# Patient Record
Sex: FEMALE | Race: WHITE | NOT HISPANIC OR LATINO | Employment: UNEMPLOYED | URBAN - METROPOLITAN AREA
[De-identification: names, ages, dates, MRNs, and addresses within clinical notes are randomized per-mention and may not be internally consistent; named-entity substitution may affect disease eponyms.]

---

## 2017-01-30 ENCOUNTER — OFFICE VISIT (OUTPATIENT)
Dept: FAMILY MEDICINE | Facility: CLINIC | Age: 67
End: 2017-01-30
Payer: MEDICARE

## 2017-01-30 ENCOUNTER — HOSPITAL ENCOUNTER (OUTPATIENT)
Dept: RADIOLOGY | Facility: HOSPITAL | Age: 67
Discharge: HOME | End: 2017-01-30
Attending: FAMILY MEDICINE
Payer: MEDICARE

## 2017-01-30 ENCOUNTER — HOSPITAL ENCOUNTER (OUTPATIENT)
Dept: RADIOLOGY | Facility: CLINIC | Age: 67
Discharge: HOME | End: 2017-01-30
Attending: FAMILY MEDICINE
Payer: MEDICARE

## 2017-01-30 VITALS
SYSTOLIC BLOOD PRESSURE: 170 MMHG | TEMPERATURE: 98 F | HEART RATE: 63 BPM | DIASTOLIC BLOOD PRESSURE: 84 MMHG | WEIGHT: 91.94 LBS | HEIGHT: 62 IN | OXYGEN SATURATION: 97 % | BODY MASS INDEX: 16.92 KG/M2

## 2017-01-30 DIAGNOSIS — Z12.11 COLON CANCER SCREENING: ICD-10-CM

## 2017-01-30 DIAGNOSIS — Z12.31 ENCOUNTER FOR SCREENING MAMMOGRAM FOR BREAST CANCER: ICD-10-CM

## 2017-01-30 DIAGNOSIS — Z12.4 CERVICAL CANCER SCREENING: ICD-10-CM

## 2017-01-30 DIAGNOSIS — M94.9 DISORDER OF BONE AND ARTICULAR CARTILAGE: ICD-10-CM

## 2017-01-30 DIAGNOSIS — R53.1 WEAKNESS: ICD-10-CM

## 2017-01-30 DIAGNOSIS — Z00.00 ANNUAL PHYSICAL EXAM: Primary | ICD-10-CM

## 2017-01-30 DIAGNOSIS — Z11.59 NEED FOR HEPATITIS C SCREENING TEST: ICD-10-CM

## 2017-01-30 DIAGNOSIS — M89.9 DISORDER OF BONE AND ARTICULAR CARTILAGE: ICD-10-CM

## 2017-01-30 PROCEDURE — 88175 CYTOPATH C/V AUTO FLUID REDO: CPT

## 2017-01-30 PROCEDURE — 77080 DXA BONE DENSITY AXIAL: CPT | Mod: TC,PO

## 2017-01-30 PROCEDURE — 99999 PR PBB SHADOW E&M-EST. PATIENT-LVL III: CPT | Mod: PBBFAC,,, | Performed by: FAMILY MEDICINE

## 2017-01-30 PROCEDURE — 77067 SCR MAMMO BI INCL CAD: CPT | Mod: TC

## 2017-01-30 PROCEDURE — 77067 SCR MAMMO BI INCL CAD: CPT | Mod: 26,,, | Performed by: RADIOLOGY

## 2017-01-30 PROCEDURE — 99397 PER PM REEVAL EST PAT 65+ YR: CPT | Mod: S$GLB,,, | Performed by: FAMILY MEDICINE

## 2017-01-30 PROCEDURE — 77063 BREAST TOMOSYNTHESIS BI: CPT | Mod: 26,,, | Performed by: RADIOLOGY

## 2017-01-30 PROCEDURE — 77080 DXA BONE DENSITY AXIAL: CPT | Mod: 26,,, | Performed by: INTERNAL MEDICINE

## 2017-01-30 RX ORDER — IBUPROFEN 200 MG
200 TABLET ORAL EVERY 6 HOURS PRN
COMMUNITY
End: 2019-04-18

## 2017-01-30 NOTE — MR AVS SNAPSHOT
[facility]  [address]  [place] [address]  Phone: [phone]  Fax: [phone]                  Mounika Barnett   2017 1:20 PM   Office Visit    Description:  Female : 1950   Provider:  Helga Harmon MD   Department:  [facility]           Reason for Visit     Annual Exam           Diagnoses this Visit        Comments    Annual physical exam    -  Primary     Disorder of bone and articular cartilage         Colon cancer screening         Cervical cancer screening         Encounter for screening mammogram for breast cancer         Need for hepatitis C screening test         Weakness                To Do List           Goals (5 Years of Data)     None      [facility]     [facility] -  Assistance  Registered nurses in the [facility] provide clinical advisement, health education, appointment booking, and other advisory services.  Call for this free service at [phone].             Medications           Message regarding Medications     Verify the changes and/or additions to your medication regime listed below are the same as discussed with your clinician today.  If any of these changes or additions are incorrect, please notify your healthcare provider.        STOP taking these medications     meloxicam (MOBIC) 15 MG tablet Take 1 tablet (15 mg total) by mouth once daily.           Verify that the below list of medications is an accurate representation of the medications you are currently taking.  If none reported, the list may be blank. If incorrect, please contact your healthcare provider. Carry this list with you in case of emergency.           Current Medications     ibuprofen (ADVIL,MOTRIN) 200 MG tablet Take 200 mg by mouth every 6 (six) hours as needed for Pain.           Clinical Reference Information           Vital Signs - Last Recorded  Most recent update: 2017  1:20 PM by Myra  "JULES Nelson    BP Pulse Temp Ht Wt SpO2    (!) 170/84 (BP Location: Left arm, Patient Position: Sitting, BP Method: Manual) 63 98 °F (36.7 °C) (Oral) 5' 2" (1.575 m) 41.7 kg (91 lb 14.9 oz) 97%    BMI                16.81 kg/m2          Blood Pressure          Most Recent Value    BP  (!)  170/84      Allergies as of 1/30/2017     No Known Allergies      Immunizations Administered on Date of Encounter - 1/30/2017     None      Orders Placed During Today's Visit      Normal Orders This Visit    Liquid-based pap smear, screening     Future Labs/Procedures Expected by Expires    DXA Bone Density Spine And Hip  1/30/2017 1/30/2018    Hepatitis C antibody  1/30/2017 3/31/2018    Mammo Digital Screening Bilateral With CAD  1/30/2017 3/30/2018    Occult blood x 1, stool  1/30/2017 1/30/2018    Occult blood x 1, stool  1/30/2017 1/30/2018    Occult blood x 1, stool  1/30/2017 1/30/2018      "

## 2017-02-07 NOTE — PROGRESS NOTES
I recommend increase calcium and vitamin D.  You should take at least 600 mg of calcium daily.  You can take 2000 IU of vitamin D daily.

## 2017-02-10 ENCOUNTER — LAB VISIT (OUTPATIENT)
Dept: LAB | Facility: HOSPITAL | Age: 67
End: 2017-02-10
Attending: FAMILY MEDICINE
Payer: MEDICARE

## 2017-02-10 DIAGNOSIS — R53.1 WEAKNESS: ICD-10-CM

## 2017-02-10 DIAGNOSIS — Z12.11 COLON CANCER SCREENING: ICD-10-CM

## 2017-02-10 PROCEDURE — 82272 OCCULT BLD FECES 1-3 TESTS: CPT | Mod: 91

## 2017-02-11 LAB
OB PNL STL: NEGATIVE

## 2017-02-17 ENCOUNTER — TELEPHONE (OUTPATIENT)
Dept: FAMILY MEDICINE | Facility: CLINIC | Age: 67
End: 2017-02-17

## 2017-02-17 NOTE — TELEPHONE ENCOUNTER
She has very good blood pressures at home.  I really do not see a need to add medication.  Her blood pressure at home is wonderful and likely gets elevated when she is here.

## 2017-02-17 NOTE — TELEPHONE ENCOUNTER
Patient came in for a blood pressure reading and also brought in her machine, I received 170/72 and the machine registered 163/89. Also copied her readings she has been taken at home. This is on your desk.

## 2017-02-20 ENCOUNTER — TELEPHONE (OUTPATIENT)
Dept: FAMILY MEDICINE | Facility: CLINIC | Age: 67
End: 2017-02-20

## 2017-02-20 NOTE — TELEPHONE ENCOUNTER
----- Message from Yaz Nichole sent at 2/17/2017  1:53 PM CST -----  Patient is returning call. please call at [phone] Thank you!

## 2017-02-20 NOTE — TELEPHONE ENCOUNTER
----- Message from Julissa Rodriguez sent at 2/17/2017  4:16 PM CST -----  Contact: Self  Pt states she's returning a call. Pt can be reached @ [phone].

## 2017-04-04 ENCOUNTER — OFFICE VISIT (OUTPATIENT)
Dept: FAMILY MEDICINE | Facility: CLINIC | Age: 67
End: 2017-04-04
Payer: MEDICARE

## 2017-04-04 VITALS
HEIGHT: 62 IN | HEART RATE: 74 BPM | OXYGEN SATURATION: 96 % | BODY MASS INDEX: 16.2 KG/M2 | WEIGHT: 88 LBS | TEMPERATURE: 98 F | SYSTOLIC BLOOD PRESSURE: 130 MMHG | DIASTOLIC BLOOD PRESSURE: 84 MMHG

## 2017-04-04 DIAGNOSIS — H66.004 RECURRENT ACUTE SUPPURATIVE OTITIS MEDIA OF RIGHT EAR WITHOUT SPONTANEOUS RUPTURE OF TYMPANIC MEMBRANE: Primary | ICD-10-CM

## 2017-04-04 PROCEDURE — 1160F RVW MEDS BY RX/DR IN RCRD: CPT | Mod: S$GLB,,, | Performed by: FAMILY MEDICINE

## 2017-04-04 PROCEDURE — 99999 PR PBB SHADOW E&M-EST. PATIENT-LVL III: CPT | Mod: PBBFAC,,, | Performed by: FAMILY MEDICINE

## 2017-04-04 PROCEDURE — 99213 OFFICE O/P EST LOW 20 MIN: CPT | Mod: S$GLB,,, | Performed by: FAMILY MEDICINE

## 2017-04-04 RX ORDER — AMOXICILLIN 500 MG/1
500 TABLET, FILM COATED ORAL EVERY 12 HOURS
Qty: 20 TABLET | Refills: 0 | Status: SHIPPED | OUTPATIENT
Start: 2017-04-04 | End: 2017-04-14

## 2017-04-04 RX ORDER — NEOMYCIN SULFATE, POLYMYXIN B SULFATE, HYDROCORTISONE 3.5; 10000; 1 MG/ML; [USP'U]/ML; MG/ML
3 SOLUTION/ DROPS AURICULAR (OTIC) EVERY 8 HOURS
Qty: 10 ML | Refills: 0 | Status: SHIPPED | OUTPATIENT
Start: 2017-04-04 | End: 2017-04-07

## 2017-04-04 NOTE — MR AVS SNAPSHOT
[facility]  [address]  [place] [address]  Phone: [phone]  Fax: [phone]                  Mounika Barnett   2017 11:20 AM   Office Visit    Description:  Female : 1950   Provider:  Helga Harmon MD   Department:  [facility]           Reason for Visit     possible infected ear     fluid in ear           Diagnoses this Visit        Comments    Recurrent acute suppurative otitis media of right ear without spontaneous rupture of tympanic membrane    -  Primary            To Do List           Goals (5 Years of Data)     None       These Medications        Disp Refills Start End    amoxicillin (AMOXIL) 500 MG Tab 20 tablet 0 2017    Take 1 tablet (500 mg total) by mouth every 12 (twelve) hours. - Oral    Pharmacy: [facility] [address] [place] [address] [place]  [address] [facility] Ph #: [phone]       neomycin-polymyxin-hydrocortisone (CORTISPORIN) otic solution 10 mL 0 2017    Place 3 drops into the right ear every 8 (eight) hours. - Right Ear    Pharmacy: [facility] [address] [place], [place] [address] - [address] [facility] Ph #: [phone]         [facility]     [facility] Care Line - 24/7 Assistance  Unless otherwise directed by your provider, please contact [facility], our nurse care line that is available for 24/7 assistance.     Registered nurses in the [facility] provide: appointment scheduling, clinical advisement, health education, and other advisory services.  Call: [phone] (toll free)               Medications           Message regarding Medications     Verify the changes and/or additions to your medication regime listed below are the same as discussed with your clinician today.  If any of these changes or additions are incorrect, please notify your healthcare provider.        START taking  "these NEW medications        Refills    amoxicillin (AMOXIL) 500 MG Tab 0    Sig: Take 1 tablet (500 mg total) by mouth every 12 (twelve) hours.    Class: Normal    Route: Oral    neomycin-polymyxin-hydrocortisone (CORTISPORIN) otic solution 0    Sig: Place 3 drops into the right ear every 8 (eight) hours.    Class: Normal    Route: Right Ear           Verify that the below list of medications is an accurate representation of the medications you are currently taking.  If none reported, the list may be blank. If incorrect, please contact your healthcare provider. Carry this list with you in case of emergency.           Current Medications     amoxicillin (AMOXIL) 500 MG Tab Take 1 tablet (500 mg total) by mouth every 12 (twelve) hours.    ibuprofen (ADVIL,MOTRIN) 200 MG tablet Take 200 mg by mouth every 6 (six) hours as needed for Pain.    neomycin-polymyxin-hydrocortisone (CORTISPORIN) otic solution Place 3 drops into the right ear every 8 (eight) hours.           Clinical Reference Information           Your Vitals Were     BP Pulse Temp Height Weight SpO2    130/84 (BP Location: Left arm, Patient Position: Sitting, BP Method: Manual) 74 98.4 °F (36.9 °C) (Oral) 5' 2" (1.575 m) 39.9 kg (88 lb) 96%    BMI                16.1 kg/m2          Blood Pressure          Most Recent Value    BP  130/84      Allergies as of 4/4/2017     No Known Allergies      Immunizations Administered on Date of Encounter - 4/4/2017     None      Language Assistance Services     ATTENTION: Language assistance services are available, free of charge. Please call [phone].      ATENCIÓN: Si habla mackenzie, tiene a chapa disposición servicios gratuitos de asistencia lingüística. Llame al [phone].     CHÚ Ý: N?u b?n nói Ti?ng Vi?t, có các d?ch v? h? tr? ngôn ng? mi?n phí dành cho b?n. G?i s? [phone].         [facility] complies with applicable Federal civil rights laws and does not discriminate on the basis of " race, color, national origin, age, disability, or sex.

## 2017-04-10 NOTE — PROGRESS NOTES
Office Visit    Patient Name: Mounika Barnett    : 1950  MRN: [id number]    Subjective:  Mounika Howell is a 66 y.o. female who presents today for:    possible infected ear (right) and fluid in ear      This patient has multiple medical diagnoses as noted below.  This patient is known to me and to this clinic.   She reports increased ear pain and fluid in her ear.  No fever.  No N/V.      Active Problem List  There is no problem list on file for this patient.      Past Surgical History  Past Surgical History:   Procedure Laterality Date     SECTION      DILATION AND CURETTAGE OF UTERUS      TONSILLECTOMY         Family History  Family History   Problem Relation Age of Onset    Hypertension Mother     Heart disease Mother     Cancer Father        Social History  Social History     Social History    Marital status:      Spouse name: N/A    Number of children: N/A    Years of education: N/A     Occupational History    Not on file.     Social History Main Topics    Smoking status: Never Smoker    Smokeless tobacco: Not on file    Alcohol use No    Drug use: No    Sexual activity: No     Other Topics Concern    Not on file     Social History Narrative       Current Medications  Medications reviewed and updated.     Allergies   Review of patient's allergies indicates:  No Known Allergies    Review of Systems (Pertinent positives)  Review of Systems   Constitutional: Negative for activity change, appetite change, fatigue, fever and unexpected weight change.   HENT: Positive for ear discharge and ear pain. Negative for rhinorrhea and sore throat.    Eyes: Negative.    Respiratory: Negative for apnea, cough, chest tightness, shortness of breath and wheezing.    Cardiovascular: Negative for chest pain, palpitations and leg swelling.   Gastrointestinal: Negative for abdominal distention, abdominal pain, constipation, diarrhea and vomiting.   Endocrine: Negative for cold intolerance,  "heat intolerance, polydipsia and polyuria.   Genitourinary: Negative for decreased urine volume, menstrual problem, urgency, vaginal bleeding, vaginal discharge and vaginal pain.   Musculoskeletal: Negative.    Skin: Negative for rash.   Neurological: Negative for dizziness and headaches.   Hematological: Does not bruise/bleed easily.   Psychiatric/Behavioral: Negative for agitation, sleep disturbance and suicidal ideas.       /84 (BP Location: Left arm, Patient Position: Sitting, BP Method: Manual)  Pulse 74  Temp 98.4 °F (36.9 °C) (Oral)   Ht 5' 2" (1.575 m)  Wt 39.9 kg (88 lb)  SpO2 96%  BMI 16.1 kg/m2    Physical Exam   Constitutional: She is oriented to person, place, and time. She appears well-developed and well-nourished.   HENT:   Head: Normocephalic and atraumatic.   Right Ear: External ear normal. There is swelling and tenderness. Tympanic membrane is injected and erythematous.   Left Ear: External ear normal.   Nose: Nose normal.   Mouth/Throat: Oropharynx is clear and moist.   Eyes: Conjunctivae and EOM are normal. Pupils are equal, round, and reactive to light.   Neck: Normal range of motion. No JVD present. No thyromegaly present.   Cardiovascular: Normal rate, regular rhythm and normal heart sounds.    Pulmonary/Chest: Effort normal and breath sounds normal. She has no wheezes.   Musculoskeletal: Normal range of motion.   Lymphadenopathy:     She has no cervical adenopathy.   Neurological: She is alert and oriented to person, place, and time.   Skin: Skin is warm and dry.   Psychiatric: She has a normal mood and affect. Her behavior is normal.   Vitals reviewed.      Health Maintenance  Health Maintenance Topics with due status: Not Due       Topic Last Completion Date    Lipid Panel 01/26/2016    Mammogram 01/30/2017    DEXA SCAN 01/30/2017       Assessment/Plan:  Mounika Barnett is a 66 y.o. female who presents today for :    Mounika Howell was seen today for possible infected ear " and fluid in ear.    Diagnoses and all orders for this visit:    Recurrent acute suppurative otitis media of right ear without spontaneous rupture of tympanic membrane  -     amoxicillin (AMOXIL) 500 MG Tab; Take 1 tablet (500 mg total) by mouth every 12 (twelve) hours.  -     neomycin-polymyxin-hydrocortisone (CORTISPORIN) otic solution; Place 3 drops into the right ear every 8 (eight) hours.  -    I have discussed the common side effects of this medication with the patient and answered all of the questions they had at the time of this visit regarding this medication.  -  RTC if symptoms worsen or persist.      No Follow-up on file.

## 2017-08-25 DIAGNOSIS — Z12.11 COLON CANCER SCREENING: ICD-10-CM

## 2017-10-10 ENCOUNTER — CLINICAL SUPPORT (OUTPATIENT)
Dept: FAMILY MEDICINE | Facility: CLINIC | Age: 67
End: 2017-10-10
Payer: MEDICARE

## 2017-10-10 PROCEDURE — 99499 UNLISTED E&M SERVICE: CPT | Mod: S$GLB,,, | Performed by: FAMILY MEDICINE

## 2017-10-10 PROCEDURE — 90662 IIV NO PRSV INCREASED AG IM: CPT | Mod: S$GLB,,, | Performed by: FAMILY MEDICINE

## 2017-10-10 PROCEDURE — G0008 ADMIN INFLUENZA VIRUS VAC: HCPCS | Mod: S$GLB,,, | Performed by: FAMILY MEDICINE

## 2018-03-20 ENCOUNTER — PES CALL (OUTPATIENT)
Dept: ADMINISTRATIVE | Facility: CLINIC | Age: 68
End: 2018-03-20

## 2018-03-21 ENCOUNTER — LAB VISIT (OUTPATIENT)
Dept: LAB | Facility: HOSPITAL | Age: 68
End: 2018-03-21
Attending: FAMILY MEDICINE
Payer: MEDICARE

## 2018-03-21 ENCOUNTER — OFFICE VISIT (OUTPATIENT)
Dept: FAMILY MEDICINE | Facility: CLINIC | Age: 68
End: 2018-03-21
Payer: MEDICARE

## 2018-03-21 VITALS
BODY MASS INDEX: 16.02 KG/M2 | TEMPERATURE: 98 F | WEIGHT: 87.06 LBS | SYSTOLIC BLOOD PRESSURE: 150 MMHG | HEART RATE: 64 BPM | OXYGEN SATURATION: 97 % | DIASTOLIC BLOOD PRESSURE: 80 MMHG | HEIGHT: 62 IN

## 2018-03-21 DIAGNOSIS — Z00.00 ANNUAL PHYSICAL EXAM: Primary | ICD-10-CM

## 2018-03-21 DIAGNOSIS — E44.1 MILD PROTEIN MALNUTRITION: ICD-10-CM

## 2018-03-21 DIAGNOSIS — Z23 NEED FOR VACCINATION WITH 13-POLYVALENT PNEUMOCOCCAL CONJUGATE VACCINE: ICD-10-CM

## 2018-03-21 DIAGNOSIS — Z00.00 ANNUAL PHYSICAL EXAM: ICD-10-CM

## 2018-03-21 LAB
ALBUMIN SERPL BCP-MCNC: 4.1 G/DL
ALP SERPL-CCNC: 91 U/L
ALT SERPL W/O P-5'-P-CCNC: 22 U/L
ANION GAP SERPL CALC-SCNC: 9 MMOL/L
AST SERPL-CCNC: 26 U/L
BASOPHILS # BLD AUTO: 0.04 K/UL
BASOPHILS NFR BLD: 0.8 %
BILIRUB SERPL-MCNC: 0.7 MG/DL
BUN SERPL-MCNC: 12 MG/DL
CALCIUM SERPL-MCNC: 10.2 MG/DL
CHLORIDE SERPL-SCNC: 104 MMOL/L
CHOLEST SERPL-MCNC: 186 MG/DL
CHOLEST/HDLC SERPL: 2.5 {RATIO}
CO2 SERPL-SCNC: 28 MMOL/L
CREAT SERPL-MCNC: 0.7 MG/DL
DIFFERENTIAL METHOD: ABNORMAL
EOSINOPHIL # BLD AUTO: 0.1 K/UL
EOSINOPHIL NFR BLD: 1 %
ERYTHROCYTE [DISTWIDTH] IN BLOOD BY AUTOMATED COUNT: 12.3 %
EST. GFR  (AFRICAN AMERICAN): >60 ML/MIN/1.73 M^2
EST. GFR  (NON AFRICAN AMERICAN): >60 ML/MIN/1.73 M^2
GLUCOSE SERPL-MCNC: 87 MG/DL
HCT VFR BLD AUTO: 47.2 %
HDLC SERPL-MCNC: 75 MG/DL
HDLC SERPL: 40.3 %
HGB BLD-MCNC: 16 G/DL
IMM GRANULOCYTES # BLD AUTO: 0.01 K/UL
IMM GRANULOCYTES NFR BLD AUTO: 0.2 %
LDLC SERPL CALC-MCNC: 98.2 MG/DL
LYMPHOCYTES # BLD AUTO: 1.2 K/UL
LYMPHOCYTES NFR BLD: 23.7 %
MCH RBC QN AUTO: 32.6 PG
MCHC RBC AUTO-ENTMCNC: 33.9 G/DL
MCV RBC AUTO: 96 FL
MONOCYTES # BLD AUTO: 0.6 K/UL
MONOCYTES NFR BLD: 11.7 %
NEUTROPHILS # BLD AUTO: 3.1 K/UL
NEUTROPHILS NFR BLD: 62.6 %
NONHDLC SERPL-MCNC: 111 MG/DL
NRBC BLD-RTO: 0 /100 WBC
PLATELET # BLD AUTO: 280 K/UL
PMV BLD AUTO: 11 FL
POTASSIUM SERPL-SCNC: 4.8 MMOL/L
PROT SERPL-MCNC: 7.3 G/DL
RBC # BLD AUTO: 4.91 M/UL
SODIUM SERPL-SCNC: 141 MMOL/L
TRIGL SERPL-MCNC: 64 MG/DL
TSH SERPL DL<=0.005 MIU/L-ACNC: 1.32 UIU/ML
WBC # BLD AUTO: 4.94 K/UL

## 2018-03-21 PROCEDURE — 85025 COMPLETE CBC W/AUTO DIFF WBC: CPT

## 2018-03-21 PROCEDURE — 80061 LIPID PANEL: CPT

## 2018-03-21 PROCEDURE — 90670 PCV13 VACCINE IM: CPT | Mod: S$GLB,,, | Performed by: FAMILY MEDICINE

## 2018-03-21 PROCEDURE — G0009 ADMIN PNEUMOCOCCAL VACCINE: HCPCS | Mod: S$GLB,,, | Performed by: FAMILY MEDICINE

## 2018-03-21 PROCEDURE — 84443 ASSAY THYROID STIM HORMONE: CPT

## 2018-03-21 PROCEDURE — 99499 UNLISTED E&M SERVICE: CPT | Mod: S$GLB,,, | Performed by: FAMILY MEDICINE

## 2018-03-21 PROCEDURE — 99397 PER PM REEVAL EST PAT 65+ YR: CPT | Mod: 25,S$GLB,, | Performed by: FAMILY MEDICINE

## 2018-03-21 PROCEDURE — 99999 PR PBB SHADOW E&M-EST. PATIENT-LVL III: CPT | Mod: PBBFAC,,, | Performed by: FAMILY MEDICINE

## 2018-03-21 PROCEDURE — 36415 COLL VENOUS BLD VENIPUNCTURE: CPT | Mod: PO

## 2018-03-21 PROCEDURE — 80053 COMPREHEN METABOLIC PANEL: CPT

## 2018-03-21 NOTE — PROGRESS NOTES
Office Visit    Patient Name: Mounika Barnett    : 1950  MRN: [id number]    Subjective:  Mounika Howell is a 67 y.o. female who presents today for:    Annual Exam      This patient has multiple medical diagnoses as noted below.  This patient is known to me and to this clinic. Patient denies any new symptoms including chest pain, SOB, blurry vision, N/V, diarrhea.        There is no problem list on file for this patient.      Past Surgical History:   Procedure Laterality Date     SECTION      DILATION AND CURETTAGE OF UTERUS      TONSILLECTOMY         Family History   Problem Relation Age of Onset    Hypertension Mother     Heart disease Mother     Cancer Father        Social History     Social History    Marital status:      Spouse name: N/A    Number of children: N/A    Years of education: N/A     Occupational History    Not on file.     Social History Main Topics    Smoking status: Never Smoker    Smokeless tobacco: Not on file    Alcohol use No    Drug use: No    Sexual activity: No     Other Topics Concern    Not on file     Social History Narrative    No narrative on file       Current Medications  Medications reviewed and updated.     Allergies   Review of patient's allergies indicates:  No Known Allergies      Labs  Lab Results   Component Value Date    HGBA1C 5.2 2016     Lab Results   Component Value Date     2016    K 4.7 2016     2016    CO2 29 2016    BUN 18 2016    CREATININE 0.7 2016    CALCIUM 9.6 2016    ANIONGAP 7 (L) 2016    ESTGFRAFRICA >60.0 2016    EGFRNONAA >60.0 2016     Lab Results   Component Value Date    CHOL 196 2016    CHOL 181 10/14/2011     Lab Results   Component Value Date    HDL 80 (H) 2016    HDL 71 10/14/2011     Lab Results   Component Value Date    LDLCALC 102.8 2016    LDLCALC 98.6 10/14/2011     Lab Results   Component Value Date    TRIG  "66 01/26/2016    TRIG 57 10/14/2011     Lab Results   Component Value Date    CHOLHDL 40.8 01/26/2016    CHOLHDL 39.2 10/14/2011     Last set of blood work has been reviewed as noted above.    Review of Systems   Constitutional: Negative for activity change, appetite change, fatigue, fever and unexpected weight change.   HENT: Negative.  Negative for ear discharge, ear pain, rhinorrhea and sore throat.    Eyes: Negative.    Respiratory: Negative for apnea, cough, chest tightness, shortness of breath and wheezing.    Cardiovascular: Negative for chest pain, palpitations and leg swelling.   Gastrointestinal: Negative for abdominal distention, abdominal pain, constipation, diarrhea and vomiting.   Endocrine: Negative for cold intolerance, heat intolerance, polydipsia and polyuria.   Genitourinary: Negative for decreased urine volume, menstrual problem, urgency, vaginal bleeding, vaginal discharge and vaginal pain.   Musculoskeletal: Negative.    Skin: Negative for rash.   Neurological: Negative for dizziness and headaches.   Hematological: Does not bruise/bleed easily.   Psychiatric/Behavioral: Negative for agitation, sleep disturbance and suicidal ideas.       BP (!) 150/80 (BP Location: Right arm, Patient Position: Sitting, BP Method: Medium (Manual))   Pulse 64   Temp 97.6 °F (36.4 °C) (Oral)   Ht 5' 2" (1.575 m)   Wt 39.5 kg (87 lb 1.3 oz)   SpO2 97%   BMI 15.93 kg/m²      Physical Exam   Constitutional: She is oriented to person, place, and time. She appears well-developed and well-nourished.   HENT:   Head: Normocephalic and atraumatic.   Right Ear: External ear normal.   Left Ear: External ear normal.   Nose: Nose normal.   Mouth/Throat: Oropharynx is clear and moist.   Eyes: Conjunctivae and EOM are normal. Pupils are equal, round, and reactive to light.   Neck: Normal range of motion. No JVD present. No thyromegaly present.   Cardiovascular: Normal rate, regular rhythm and normal heart sounds.  "   Pulmonary/Chest: Effort normal and breath sounds normal. She has no wheezes.   Abdominal: Soft. Bowel sounds are normal. She exhibits no distension. There is no tenderness.   Musculoskeletal: Normal range of motion.   Lymphadenopathy:     She has no cervical adenopathy.   Neurological: She is alert and oriented to person, place, and time. She has normal reflexes.   Skin: Skin is warm and dry.   Psychiatric: She has a normal mood and affect. Her behavior is normal. Judgment and thought content normal.   Vitals reviewed.      Health Maintenance  Health Maintenance       Date Due Completion Date    TETANUS VACCINE 10/04/1969 ---    Colonoscopy 10/04/2001 ---    Zoster Vaccine 10/04/2011 ---    Pneumococcal (65+) (1 of 2 - PCV13) 10/04/2016 ---    Mammogram 01/30/2019 1/30/2017    DEXA SCAN 01/30/2020 1/30/2017    Lipid Panel 01/26/2021 1/26/2016          Assessment/Plan:  Mounika Barnett is a 67 y.o. female who presents today for :    1. Annual physical exam    2. Mild protein malnutrition    3. Need for vaccination with 13-polyvalent pneumococcal conjugate vaccine        Problem List Items Addressed This Visit     None      Visit Diagnoses     Annual physical exam    -  Primary    Relevant Orders    TSH    Lipid panel    Comprehensive metabolic panel    CBC auto differential    Mild protein malnutrition        Relevant Orders    TSH    Lipid panel    Comprehensive metabolic panel    CBC auto differential    Need for vaccination with 13-polyvalent pneumococcal conjugate vaccine        Relevant Orders    Pneumococcal Conjugate Vaccine (13 Valent) (IM) (Completed)          Follow-up in about 1 year (around 3/21/2019).     This note was created by combination of typed  and Dragon dictation.  Transcription errors may be present.  If there are any questions, please contact me.

## 2018-04-10 ENCOUNTER — PES CALL (OUTPATIENT)
Dept: ADMINISTRATIVE | Facility: CLINIC | Age: 68
End: 2018-04-10

## 2018-05-03 ENCOUNTER — OFFICE VISIT (OUTPATIENT)
Dept: FAMILY MEDICINE | Facility: CLINIC | Age: 68
End: 2018-05-03
Payer: MEDICARE

## 2018-05-03 VITALS
BODY MASS INDEX: 16.43 KG/M2 | HEART RATE: 59 BPM | WEIGHT: 89.31 LBS | HEIGHT: 62 IN | DIASTOLIC BLOOD PRESSURE: 78 MMHG | SYSTOLIC BLOOD PRESSURE: 110 MMHG | OXYGEN SATURATION: 97 %

## 2018-05-03 DIAGNOSIS — M19.042 OSTEOARTHRITIS OF BOTH HANDS, UNSPECIFIED OSTEOARTHRITIS TYPE: ICD-10-CM

## 2018-05-03 DIAGNOSIS — M19.041 OSTEOARTHRITIS OF BOTH HANDS, UNSPECIFIED OSTEOARTHRITIS TYPE: ICD-10-CM

## 2018-05-03 DIAGNOSIS — Z00.00 ENCOUNTER FOR PREVENTIVE HEALTH EXAMINATION: Primary | ICD-10-CM

## 2018-05-03 PROCEDURE — 99999 PR PBB SHADOW E&M-EST. PATIENT-LVL III: CPT | Mod: PBBFAC,,, | Performed by: NURSE PRACTITIONER

## 2018-05-03 PROCEDURE — G0439 PPPS, SUBSEQ VISIT: HCPCS | Mod: S$GLB,,, | Performed by: NURSE PRACTITIONER

## 2018-05-03 NOTE — PROGRESS NOTES
"Mounika Barnett presented for a  Medicare AWV and comprehensive Health Risk Assessment today. The following components were reviewed and updated:    · Medical history  · Family History  · Social history  · Allergies and Current Medications  · Health Risk Assessment  · Health Maintenance  · Care Team     ** See Completed Assessments for Annual Wellness Visit within the encounter summary.**       The following assessments were completed:  · Living Situation  · CAGE  · Depression Screening  · Timed Get Up and Go  · Whisper Test  · Cognitive Function Screening  · Nutrition Screening  · ADL Screening  · PAQ Screening    Vitals:    05/03/18 0932   BP: 110/78   BP Location: Left arm   Patient Position: Sitting   BP Method: Large (Manual)   Pulse: (!) 59   SpO2: 97%   Weight: 40.5 kg (89 lb 4.6 oz)   Height: 5' 2" (1.575 m)     Body mass index is 16.33 kg/m².  Physical Exam   Constitutional: She is oriented to person, place, and time.   Cardiovascular: Normal rate.    Pulmonary/Chest: Effort normal.   Neurological: She is alert and oriented to person, place, and time.   Vitals reviewed.        Diagnoses and health risks identified today and associated recommendations/orders:    1. Encounter for preventive health examination  Education provided about preventive health examinations and procedures; addressed and discussed patient's health concerns. Additionally, reviewed medical record for risk factors and documented the results during this encounter.    2. Body mass index (BMI) less than 16.5  Patient prepare family meals based upon a diabetic diet.     3. Osteoarthritis of both hands, unspecified osteoarthritis type  Discussed OTC supplements and hand exercises she can perform when having arthritic type pain.  Advised about NSAIDS     Reviewed health maintenance with patient, educated about recommended examinations, procedures (labs & images), and immunizations. Declined tetanus and zoster vaccines at today's visit. " Encouraged to obtain at local pharmacy if she desires the vaccinations.     Provided Mounika Howell with a 5-10 year written screening schedule and personal prevention plan. Recommendations were developed using the USPSTF age appropriate recommendations. Education, counseling, and referrals were provided as needed. After Visit Summary printed and given to patient which includes a list of additional screenings\tests needed.    Follow-up in about 1 year (around 5/3/2019) for assessment .    Stanford Craig Jr, NP

## 2018-05-03 NOTE — PATIENT INSTRUCTIONS
Counseling and Referral of Other Preventative  (Italic type indicates deductible and co-insurance are waived)    Patient Name: Mounika Barnett  Today's Date: 5/3/2018    Health Maintenance       Date Due Completion Date    TETANUS VACCINE 10/04/1969 ---    Colonoscopy 10/04/2001 ---    Zoster Vaccine 10/04/2011 ---    Influenza Vaccine 08/01/2018 10/10/2017    Mammogram 01/30/2019 1/30/2017    Pneumococcal (65+) (2 of 2 - PPSV23) 03/21/2019 3/21/2018    DEXA SCAN 01/30/2020 1/30/2017    Lipid Panel 03/21/2023 3/21/2018        No orders of the defined types were placed in this encounter.    The following information is provided to all patients.  This information is to help you find resources for any of the problems found today that may be affecting your health:                Living healthy guide: www.Pending [facility] to [facility].[place].[facility]      Understanding Diabetes: www.diabetes.org      Eating healthy: www.cdc.gov/healthyweight      [facility] home safety checklist: www.cdc.gov/steadi/patient.html      Agency on Aging: www.goea.[place].[facility]      Alcoholics anonymous (AA): www.aa.org      Physical Activity: www.flakito.nih.gov/ri5hews      Tobacco use: www.quitwithusla.org     Chondroitin; Glucosamine tablets or capsules  What is this medicine?  CHONDROITIN; GLUCOSAMINE (katelin DROI tin; gloo KOH nereida meen) is a dietary supplement. It is promoted for its ability to reduce the symptoms of osteoarthritis by maintaining healthy joint cartilage. The FDA has not approved this supplement for any medical use.  How should I use this medicine?  Take this supplement by mouth with a glass of water. Follow the directions on the package labeling, or take as directed by your health care professional. Take your medicine at regular intervals. Do not take this supplement more often than directed.  Talk to your pediatrician regarding the use of this medicine in children. Special care may be needed.  What side effects may I notice from receiving this  medicine?  Side effects that you should report to your doctor or health care professional as soon as possible:  · allergic reactions like skin rash, itching or hives, swelling of the face, lips, or tongue  · breathing problems  · constipation  · diarrhea  · difficulty sleeping  · drowsiness  · hair loss  · headache  · loss of appetite  · stomach pain  · swelling of the ankles or feet  Side effects that usually do not require medical attention (report to your doctor or health care professional if they continue or are bothersome):  · gas  · nausea  · upset stomach  What may interact with this medicine?  Check with your doctor or healthcare professional if you are taking any of the following medications:  · warfarin  What if I miss a dose?  If you miss a dose, take it as soon as you can. If it is almost time for your next dose, take only that dose. Do not take double or extra doses.  Where should I keep my medicine?  Keep out of the reach of children.  Store at room temperature between 15 and 30 degrees C (59 and 86 degrees F) or as directed on the package label. Protect from moisture. Throw away any unused supplement after the expiration date.  What should I tell my health care provider before I take this medicine?  They need to know if you have any of these conditions:  · diabetes  · heart disease  · kidney disease  · liver disease  · stomach or intestinal problems  · an unusual or allergic reaction to chondroitin, glucosamine, sulfonamides, other medicines, foods, dyes, or preservatives  · pregnant or trying to get pregnant  · breast-feeding  What should I watch for while using this medicine?  Tell your doctor or healthcare professional if your symptoms do not start to get better or if they get worse. This supplement may take several weeks to work for you.  If you are scheduled for any medical or dental procedure, tell your healthcare provider that you are taking this supplement. You may need to stop taking this  supplement before the procedure.  This supplement may affect blood sugar levels. If you have diabetes, check with your doctor or health care professional before you change your diet or the dose of your diabetic medicine.  Herbal or dietary supplements are not regulated like medicines. Rigid  standards are not required for dietary supplements. The purity and strength of these products can vary. The safety and effect of this dietary supplement for a certain disease or illness is not well known. This product is not intended to diagnose, treat, cure or prevent any disease.  The Food and Drug Administration suggests the following to help consumers protect themselves:  · Always read product labels and follow directions.  · Natural does not mean a product is safe for humans to take.  · Look for products that include USP after the ingredient name. This means that the  followed the standards of the [place] Pharmacopoeia.  · Supplements made or sold by a nationally known food or drug company are more likely to be made under tight controls. You can write to the company for more information about how the product was made.  NOTE:This sheet is a summary. It may not cover all possible information. If you have questions about this medicine, talk to your doctor, pharmacist, or health care provider. Copyright© 2017 [employer] Standard

## 2018-10-01 ENCOUNTER — CLINICAL SUPPORT (OUTPATIENT)
Dept: FAMILY MEDICINE | Facility: CLINIC | Age: 68
End: 2018-10-01
Payer: MEDICARE

## 2018-10-01 DIAGNOSIS — Z23 NEED FOR PROPHYLACTIC VACCINATION AND INOCULATION AGAINST INFLUENZA: Primary | ICD-10-CM

## 2018-10-01 PROCEDURE — 90662 IIV NO PRSV INCREASED AG IM: CPT | Mod: PBBFAC,PO | Performed by: FAMILY MEDICINE

## 2018-10-01 PROCEDURE — 99499 UNLISTED E&M SERVICE: CPT | Mod: S$PBB,,, | Performed by: FAMILY MEDICINE

## 2019-01-25 ENCOUNTER — PES CALL (OUTPATIENT)
Dept: ADMINISTRATIVE | Facility: CLINIC | Age: 69
End: 2019-01-25

## 2019-02-08 DIAGNOSIS — Z12.39 BREAST CANCER SCREENING: ICD-10-CM

## 2019-02-13 ENCOUNTER — HOSPITAL ENCOUNTER (OUTPATIENT)
Dept: RADIOLOGY | Facility: HOSPITAL | Age: 69
Discharge: HOME | End: 2019-02-13
Attending: FAMILY MEDICINE
Payer: MEDICARE

## 2019-02-13 DIAGNOSIS — Z12.39 BREAST CANCER SCREENING: ICD-10-CM

## 2019-02-13 PROCEDURE — 77063 MAMMO DIGITAL SCREENING BILAT WITH TOMOSYNTHESIS_CAD: ICD-10-PCS | Mod: 26,HCNC,, | Performed by: RADIOLOGY

## 2019-02-13 PROCEDURE — 77067 SCR MAMMO BI INCL CAD: CPT | Mod: TC,HCNC,PO

## 2019-02-13 PROCEDURE — 77063 BREAST TOMOSYNTHESIS BI: CPT | Mod: 26,HCNC,, | Performed by: RADIOLOGY

## 2019-02-13 PROCEDURE — 77067 SCR MAMMO BI INCL CAD: CPT | Mod: 26,HCNC,, | Performed by: RADIOLOGY

## 2019-02-13 PROCEDURE — 77067 MAMMO DIGITAL SCREENING BILAT WITH TOMOSYNTHESIS_CAD: ICD-10-PCS | Mod: 26,HCNC,, | Performed by: RADIOLOGY

## 2019-03-07 ENCOUNTER — PATIENT OUTREACH (OUTPATIENT)
Dept: ADMINISTRATIVE | Facility: HOSPITAL | Age: 69
End: 2019-03-07

## 2019-03-07 DIAGNOSIS — Z12.11 SPECIAL SCREENING FOR MALIGNANT NEOPLASM OF COLON: Primary | ICD-10-CM

## 2019-03-21 ENCOUNTER — OFFICE VISIT (OUTPATIENT)
Dept: FAMILY MEDICINE | Facility: CLINIC | Age: 69
End: 2019-03-21
Payer: MEDICARE

## 2019-03-21 ENCOUNTER — LAB VISIT (OUTPATIENT)
Dept: LAB | Facility: HOSPITAL | Age: 69
End: 2019-03-21
Attending: FAMILY MEDICINE
Payer: MEDICARE

## 2019-03-21 VITALS
DIASTOLIC BLOOD PRESSURE: 78 MMHG | HEART RATE: 82 BPM | WEIGHT: 85.56 LBS | BODY MASS INDEX: 15.75 KG/M2 | TEMPERATURE: 98 F | SYSTOLIC BLOOD PRESSURE: 130 MMHG | HEIGHT: 62 IN | OXYGEN SATURATION: 99 %

## 2019-03-21 DIAGNOSIS — E44.1 MILD PROTEIN-CALORIE MALNUTRITION: ICD-10-CM

## 2019-03-21 DIAGNOSIS — Z00.00 ANNUAL PHYSICAL EXAM: Primary | ICD-10-CM

## 2019-03-21 DIAGNOSIS — Z00.00 ANNUAL PHYSICAL EXAM: ICD-10-CM

## 2019-03-21 DIAGNOSIS — Z23 NEED FOR 23-POLYVALENT PNEUMOCOCCAL POLYSACCHARIDE VACCINE: ICD-10-CM

## 2019-03-21 LAB
ALBUMIN SERPL BCP-MCNC: 4.4 G/DL
ALP SERPL-CCNC: 99 U/L
ALT SERPL W/O P-5'-P-CCNC: 22 U/L
ANION GAP SERPL CALC-SCNC: 8 MMOL/L
AST SERPL-CCNC: 23 U/L
BASOPHILS # BLD AUTO: 0.04 K/UL
BASOPHILS NFR BLD: 0.8 %
BILIRUB SERPL-MCNC: 0.8 MG/DL
BUN SERPL-MCNC: 16 MG/DL
CALCIUM SERPL-MCNC: 10.5 MG/DL
CHLORIDE SERPL-SCNC: 103 MMOL/L
CHOLEST SERPL-MCNC: 196 MG/DL
CHOLEST/HDLC SERPL: 2.3 {RATIO}
CO2 SERPL-SCNC: 28 MMOL/L
CREAT SERPL-MCNC: 0.7 MG/DL
DIFFERENTIAL METHOD: ABNORMAL
EOSINOPHIL # BLD AUTO: 0 K/UL
EOSINOPHIL NFR BLD: 0.6 %
ERYTHROCYTE [DISTWIDTH] IN BLOOD BY AUTOMATED COUNT: 12.2 %
EST. GFR  (AFRICAN AMERICAN): >60 ML/MIN/1.73 M^2
EST. GFR  (NON AFRICAN AMERICAN): >60 ML/MIN/1.73 M^2
GLUCOSE SERPL-MCNC: 94 MG/DL
HCT VFR BLD AUTO: 48.3 %
HDLC SERPL-MCNC: 87 MG/DL
HDLC SERPL: 44.4 %
HGB BLD-MCNC: 16.1 G/DL
IMM GRANULOCYTES # BLD AUTO: 0.01 K/UL
IMM GRANULOCYTES NFR BLD AUTO: 0.2 %
LDLC SERPL CALC-MCNC: 96.6 MG/DL
LYMPHOCYTES # BLD AUTO: 1.1 K/UL
LYMPHOCYTES NFR BLD: 21.2 %
MCH RBC QN AUTO: 32.2 PG
MCHC RBC AUTO-ENTMCNC: 33.3 G/DL
MCV RBC AUTO: 97 FL
MONOCYTES # BLD AUTO: 0.6 K/UL
MONOCYTES NFR BLD: 11.2 %
NEUTROPHILS # BLD AUTO: 3.4 K/UL
NEUTROPHILS NFR BLD: 66 %
NONHDLC SERPL-MCNC: 109 MG/DL
NRBC BLD-RTO: 0 /100 WBC
PLATELET # BLD AUTO: 291 K/UL
PMV BLD AUTO: 11 FL
POTASSIUM SERPL-SCNC: 4.4 MMOL/L
PROT SERPL-MCNC: 7.9 G/DL
RBC # BLD AUTO: 5 M/UL
SODIUM SERPL-SCNC: 139 MMOL/L
TRIGL SERPL-MCNC: 62 MG/DL
TSH SERPL DL<=0.005 MIU/L-ACNC: 1.27 UIU/ML
WBC # BLD AUTO: 5.18 K/UL

## 2019-03-21 PROCEDURE — 99397 PR PREVENTIVE VISIT,EST,65 & OVER: ICD-10-PCS | Mod: 25,HCNC,S$GLB, | Performed by: FAMILY MEDICINE

## 2019-03-21 PROCEDURE — 99499 UNLISTED E&M SERVICE: CPT | Mod: ,,, | Performed by: FAMILY MEDICINE

## 2019-03-21 PROCEDURE — 90732 PPSV23 VACC 2 YRS+ SUBQ/IM: CPT | Mod: HCNC,S$GLB,, | Performed by: FAMILY MEDICINE

## 2019-03-21 PROCEDURE — 80053 COMPREHEN METABOLIC PANEL: CPT | Mod: HCNC

## 2019-03-21 PROCEDURE — 99499 UNLISTED E&M SERVICE: CPT | Mod: S$GLB,,, | Performed by: FAMILY MEDICINE

## 2019-03-21 PROCEDURE — 84443 ASSAY THYROID STIM HORMONE: CPT | Mod: HCNC

## 2019-03-21 PROCEDURE — 36415 COLL VENOUS BLD VENIPUNCTURE: CPT | Mod: HCNC,PO

## 2019-03-21 PROCEDURE — 99499 RISK ADDL DX/OHS AUDIT: ICD-10-PCS | Mod: ,,, | Performed by: FAMILY MEDICINE

## 2019-03-21 PROCEDURE — 85025 COMPLETE CBC W/AUTO DIFF WBC: CPT | Mod: HCNC

## 2019-03-21 PROCEDURE — G0009 PNEUMOCOCCAL POLYSACCHARIDE VACCINE 23-VALENT =>2YO SQ IM: ICD-10-PCS | Mod: HCNC,S$GLB,, | Performed by: FAMILY MEDICINE

## 2019-03-21 PROCEDURE — 99999 PR PBB SHADOW E&M-EST. PATIENT-LVL III: CPT | Mod: PBBFAC,HCNC,, | Performed by: FAMILY MEDICINE

## 2019-03-21 PROCEDURE — 99499 RISK ADDL DX/OHS AUDIT: ICD-10-PCS | Mod: S$GLB,,, | Performed by: FAMILY MEDICINE

## 2019-03-21 PROCEDURE — 90732 PNEUMOCOCCAL POLYSACCHARIDE VACCINE 23-VALENT =>2YO SQ IM: ICD-10-PCS | Mod: HCNC,S$GLB,, | Performed by: FAMILY MEDICINE

## 2019-03-21 PROCEDURE — 80061 LIPID PANEL: CPT | Mod: HCNC

## 2019-03-21 PROCEDURE — 99999 PR PBB SHADOW E&M-EST. PATIENT-LVL III: ICD-10-PCS | Mod: PBBFAC,HCNC,, | Performed by: FAMILY MEDICINE

## 2019-03-21 PROCEDURE — G0009 ADMIN PNEUMOCOCCAL VACCINE: HCPCS | Mod: HCNC,S$GLB,, | Performed by: FAMILY MEDICINE

## 2019-03-21 PROCEDURE — 99397 PER PM REEVAL EST PAT 65+ YR: CPT | Mod: 25,HCNC,S$GLB, | Performed by: FAMILY MEDICINE

## 2019-03-21 NOTE — PROGRESS NOTES
Injection administered as ordered.  Tolerated well.  Told to wait in lobby for 15 mins.  Patient verbalized understanding.

## 2019-03-21 NOTE — PROGRESS NOTES
FitKit was given to patient on 3/21/2019 9:48 AM       Answers for HPI/ROS submitted by the patient on 3/17/2019   activity change: No  unexpected weight change: No  neck pain: No  hearing loss: Yes  rhinorrhea: Yes  trouble swallowing: No  eye discharge: No  visual disturbance: No  chest tightness: No  wheezing: No  chest pain: No  palpitations: Yes  blood in stool: No  constipation: No  vomiting: No  diarrhea: No  polydipsia: No  polyuria: No  difficulty urinating: No  hematuria: No  menstrual problem: No  dysuria: No  joint swelling: Yes  arthralgias: Yes  headaches: No  weakness: No  confusion: No  dysphoric mood: Yes

## 2019-03-21 NOTE — PROGRESS NOTES
Assessment & Plan  Problem List Items Addressed This Visit        Endocrine    Mild protein-calorie malnutrition    Relevant Orders    CBC auto differential    Comprehensive metabolic panel    Lipid panel    TSH      Other Visit Diagnoses     Annual physical exam    -  Primary    Relevant Orders    CBC auto differential    Comprehensive metabolic panel    Lipid panel    TSH    Need for 23-polyvalent pneumococcal polysaccharide vaccine        Relevant Orders    (In Office Administered) Pneumococcal Polysaccharide Vaccine (23 Valent) (SQ/IM)      I addressed all major concerns as it related to health maintenance.  All were ordered and scheduled based on the patients wishes.  Any additional health maintenance will be readdressed at the next physical if declined or deferred by the patient.      Discuss weight in the office today.  She has lost 3 lbs since last evaluation.  Discussed slight increase in weight would be beneficial.    Health Maintenance reviewed.     Follow-up: Follow-up in about 1 year (around 3/21/2020).    ______________________________________________________________________    Chief Complaint  Chief Complaint   Patient presents with    Annual Exam       HPI  Mounika Barnett is a 68 y.o. female with multiple medical diagnoses as listed in the medical history and problem list that presents for annual exam.  Pt is known to me with last appointment 3/21/2018.    Patient denies any new symptoms including chest pain, SOB, blurry vision, N/V, diarrhea.        PAST MEDICAL HISTORY:  Past Medical History:   Diagnosis Date    Osteoarthritis of both hands 5/3/2018       PAST SURGICAL HISTORY:  Past Surgical History:   Procedure Laterality Date     SECTION      DILATION AND CURETTAGE OF UTERUS      TONSILLECTOMY         SOCIAL HISTORY:  Social History     Socioeconomic History    Marital status:      Spouse name: Not on file    Number of children: Not on file    Years of education:  Not on file    Highest education level: Not on file   Social Needs    Financial resource strain: Not on file    Food insecurity - worry: Not on file    Food insecurity - inability: Not on file    Transportation needs - medical: Not on file    Transportation needs - non-medical: Not on file   Occupational History    Not on file   Tobacco Use    Smoking status: Never Smoker    Smokeless tobacco: Never Used   Substance and Sexual Activity    Alcohol use: No     Alcohol/week: 0.0 oz    Drug use: No    Sexual activity: No     Partners: Male   Other Topics Concern    Not on file   Social History Narrative    Not on file       FAMILY HISTORY:  Family History   Problem Relation Age of Onset    Hypertension Mother     Heart disease Mother     Cancer Father         liver CA    Hypertension Brother        ALLERGIES AND MEDICATIONS: updated and reviewed.  Review of patient's allergies indicates:  No Known Allergies  Current Outpatient Medications   Medication Sig Dispense Refill    calcium citrate/vitamin D2 (CALCIUM CITRATE WITH D ORAL) Take by mouth.      ibuprofen (ADVIL,MOTRIN) 200 MG tablet Take 200 mg by mouth every 6 (six) hours as needed for Pain.       No current facility-administered medications for this visit.          ROS  Review of Systems   Constitutional: Negative for activity change and unexpected weight change.   HENT: Positive for hearing loss and rhinorrhea. Negative for trouble swallowing.    Eyes: Negative for discharge and visual disturbance.   Respiratory: Negative for chest tightness and wheezing.    Cardiovascular: Positive for palpitations. Negative for chest pain.   Gastrointestinal: Negative for blood in stool, constipation, diarrhea and vomiting.   Endocrine: Negative for polydipsia and polyuria.   Genitourinary: Negative for difficulty urinating, dysuria, hematuria and menstrual problem.   Musculoskeletal: Positive for arthralgias and joint swelling. Negative for neck pain.  "  Neurological: Negative for weakness and headaches.   Psychiatric/Behavioral: Positive for dysphoric mood. Negative for confusion.         Physical Exam  Vitals:    03/21/19 0803   BP: 130/78   BP Location: Left arm   Patient Position: Sitting   BP Method: Pediatric (Manual)   Pulse: 82   Temp: 97.7 °F (36.5 °C)   TempSrc: Oral   SpO2: 99%   Weight: 38.8 kg (85 lb 8.6 oz)   Height: 5' 2" (1.575 m)    Body mass index is 15.65 kg/m².  Weight: 38.8 kg (85 lb 8.6 oz)   Height: 5' 2" (157.5 cm)   Physical Exam   Constitutional: She is oriented to person, place, and time. She appears well-developed and well-nourished.   HENT:   Head: Normocephalic and atraumatic.   Right Ear: External ear normal.   Left Ear: External ear normal.   Nose: Nose normal.   Mouth/Throat: Oropharynx is clear and moist.   Eyes: Conjunctivae and EOM are normal. Pupils are equal, round, and reactive to light.   Cardiovascular: Normal rate, regular rhythm and normal heart sounds.   Pulmonary/Chest: Effort normal and breath sounds normal.   Neurological: She is alert and oriented to person, place, and time.   Skin: Skin is warm and dry.   Vitals reviewed.        Health Maintenance       Date Due Completion Date    Pneumococcal Vaccine (65+ Low/Medium Risk) (2 of 2 - PPSV23) 03/21/2019 3/21/2018    Colonoscopy 01/01/2020 (Originally 10/4/2001) ---    DEXA SCAN 01/30/2020 1/30/2017    Mammogram 02/13/2021 2/13/2019    Lipid Panel 03/21/2023 3/21/2018    TETANUS VACCINE 05/03/2028 5/3/2018 (Declined)    Override on 5/3/2018: Declined              Patient note was created using Animoto.  Any errors in syntax or even information may not have been identified and edited on initial review prior to signing this note.  "

## 2019-03-22 PROCEDURE — 82274 ASSAY TEST FOR BLOOD FECAL: CPT | Mod: HCNC

## 2019-03-26 ENCOUNTER — LAB VISIT (OUTPATIENT)
Dept: LAB | Facility: HOSPITAL | Age: 69
End: 2019-03-26
Attending: FAMILY MEDICINE
Payer: MEDICARE

## 2019-03-26 DIAGNOSIS — Z12.11 SPECIAL SCREENING FOR MALIGNANT NEOPLASM OF COLON: ICD-10-CM

## 2019-03-26 LAB — HEMOCCULT STL QL IA: NEGATIVE

## 2019-04-18 ENCOUNTER — OFFICE VISIT (OUTPATIENT)
Dept: FAMILY MEDICINE | Facility: CLINIC | Age: 69
End: 2019-04-18
Payer: MEDICARE

## 2019-04-18 VITALS
DIASTOLIC BLOOD PRESSURE: 62 MMHG | OXYGEN SATURATION: 98 % | TEMPERATURE: 98 F | HEIGHT: 62 IN | WEIGHT: 87.5 LBS | HEART RATE: 72 BPM | SYSTOLIC BLOOD PRESSURE: 120 MMHG | BODY MASS INDEX: 16.1 KG/M2

## 2019-04-18 DIAGNOSIS — M19.042 OSTEOARTHRITIS OF BOTH HANDS, UNSPECIFIED OSTEOARTHRITIS TYPE: ICD-10-CM

## 2019-04-18 DIAGNOSIS — Z00.00 ENCOUNTER FOR PREVENTIVE HEALTH EXAMINATION: Primary | ICD-10-CM

## 2019-04-18 DIAGNOSIS — M19.041 OSTEOARTHRITIS OF BOTH HANDS, UNSPECIFIED OSTEOARTHRITIS TYPE: ICD-10-CM

## 2019-04-18 DIAGNOSIS — E44.1 MILD PROTEIN-CALORIE MALNUTRITION: ICD-10-CM

## 2019-04-18 PROCEDURE — G0439 PPPS, SUBSEQ VISIT: HCPCS | Mod: HCNC,S$GLB,, | Performed by: PHYSICIAN ASSISTANT

## 2019-04-18 PROCEDURE — 99999 PR PBB SHADOW E&M-EST. PATIENT-LVL IV: ICD-10-PCS | Mod: PBBFAC,HCNC,, | Performed by: PHYSICIAN ASSISTANT

## 2019-04-18 PROCEDURE — G0439 PR MEDICARE ANNUAL WELLNESS SUBSEQUENT VISIT: ICD-10-PCS | Mod: HCNC,S$GLB,, | Performed by: PHYSICIAN ASSISTANT

## 2019-04-18 PROCEDURE — 99999 PR PBB SHADOW E&M-EST. PATIENT-LVL IV: CPT | Mod: PBBFAC,HCNC,, | Performed by: PHYSICIAN ASSISTANT

## 2019-04-18 NOTE — PATIENT INSTRUCTIONS
Counseling and Referral of Other Preventative  (Italic type indicates deductible and co-insurance are waived)    Patient Name: Mounika Barnett  Today's Date: 4/18/2019    Health Maintenance       Date Due Completion Date    DEXA SCAN 01/30/2020 1/30/2017    Fecal Occult Blood Test (FOBT)/FitKit 03/22/2020 3/22/2019    Mammogram 02/13/2021 2/13/2019    Lipid Panel 03/21/2024 3/21/2019    TETANUS VACCINE 05/03/2028 5/3/2018 (Declined)    Override on 5/3/2018: Declined        No orders of the defined types were placed in this encounter.    The following information is provided to all patients.  This information is to help you find resources for any of the problems found today that may be affecting your health:                Living healthy guide: www.[facility].[place].gov      Understanding Diabetes: www.diabetes.org      Eating healthy: www.cdc.gov/healthyweight      [facility] home safety checklist: www.cdc.gov/steadi/patient.html      Agency on Aging: www.goea.[place].gov      Alcoholics anonymous (AA): www.aa.org      Physical Activity: www.flakito.nih.gov/um2yiul      Tobacco use: www.quitwithusla.org

## 2019-04-18 NOTE — PROGRESS NOTES
"Mounika Barnett presented for Comprehensive Health Risk Assessment and Medicare AWV today. The following components were reviewed and updated:    · Medical history  · Family History  · Social history  · Allergies and Current Medications  · Health Risk Assessment  · Health Maintenance  · Care Team    **See Completed Assessments for Annual Wellness visit with in the encounter summary    The following assessments were completed:  · Living situation  · Cage  · Depression Screening  · Cognitive function Screening  · Timed Get Up Test  · Whisper Test  · Nutrition screening  · ADL screening  · PAQ screening  · Osteoporosis screening      Vitals:    04/18/19 0757   BP: 120/62   BP Location: Right arm   Patient Position: Sitting   BP Method: Small (Manual)   Pulse: 72   Temp: 98.2 °F (36.8 °C)   SpO2: 98%   Weight: 39.7 kg (87 lb 8.4 oz)   Height: 5' 2" (1.575 m)     Body mass index is 16.01 kg/m².   ]        General: Well developed, well nourished. No acute distress.  Head: Head is normocephalic, atraumatic; ears are normal.   Eyes: Clear conjunctiva. EOM are intact. PERRL. Eyes lids normal without swelling or lesions.   Throat: Mucosa pink and moist. Uvula midline. Good dentition.   Neck: Supple, symmetrical neck; trachea midline. Thyroid gland is normal without masses. No JVD. Carotid pulse 2+ bilaterally.   Lungs: Clear to auscultation bilaterally and normal respiratory effort. No rales, rhonchi, or wheezing.  Cardiovascular: Regular rate and rhythm. No murmurs, gallops or rubs auscultated. Normal S1 and S2.  Extremities: No LE edema. Pulses 2+ and symmetric.   Abdomen: Abdomen is soft, non-tender non-distended with normal bowel sounds.  Skin: Skin color, texture, turgor normal. No rashes.  Musculoskeletal: Full range of motion bilaterally. Normal tone and 5/5 strength bilaterally.    Lymph Nodes: No cervical or supraclavicular adenopathy.  Neurologic: Alert and oriented to person, place and time. Normal muscle " strength 5/5 bilaterally.  Cranial nerves intact. No gait abnormalities.  Psychiatric: Appropriate mood and affect. Good judgment and insight.        Diagnoses and health risks identified today and associated recommendations/orders:    1. Encounter for preventive health examination  Education provided about preventive health examinations and procedures; addressed and discussed patient's health concerns. Additionally, reviewed medical record for risk factors and documented the results during this encounter.      2. Mild protein-calorie malnutrition  See below    3. Body mass index (BMI) less than 16.5  See below    4. Osteoarthritis of both hands, unspecified osteoarthritis type  See below    Problem list issues addresses during this visit    Body mass index (BMI) less than 16.5  Stable. No significant weight loss recently. Continue to monitor,  followed by PCP      Mild protein-calorie malnutrition  Stable. Continue to monitor, followed by PCP      Osteoarthritis of both hands  Stable, chronic as reviewed in record, on daily calcium and Vit D  followed by PCP  Continue current treatment plan          Reviewed health maintenance with patient, educated about recommended examinations, procedures (labs & images), and immunization.  Patient is up to date with age appropriate screenings and immunizations.       Provided Mounika Howell with a 5-10 year written screening schedule and personal prevention plan. Recommendations were developed using the USPSTF age appropriate recommendations. Education, counseling, and referrals were provided as needed.  After Visit Summary printed and given to patient which includes a list of additional screenings\tests needed.    Follow up in about 1 year (around 4/18/2020).      Teri Nielson PA-C

## 2019-04-18 NOTE — ASSESSMENT & PLAN NOTE
Stable, chronic as reviewed in record, on daily calcium and Vit D  followed by PCP  Continue current treatment plan

## 2019-05-08 ENCOUNTER — OFFICE VISIT (OUTPATIENT)
Dept: FAMILY MEDICINE | Facility: CLINIC | Age: 69
End: 2019-05-08
Payer: MEDICARE

## 2019-05-08 VITALS
HEIGHT: 62 IN | HEART RATE: 96 BPM | TEMPERATURE: 98 F | DIASTOLIC BLOOD PRESSURE: 58 MMHG | BODY MASS INDEX: 16.18 KG/M2 | WEIGHT: 87.94 LBS | OXYGEN SATURATION: 95 % | SYSTOLIC BLOOD PRESSURE: 118 MMHG

## 2019-05-08 DIAGNOSIS — R31.9 HEMATURIA, UNSPECIFIED TYPE: ICD-10-CM

## 2019-05-08 DIAGNOSIS — R30.0 DYSURIA: ICD-10-CM

## 2019-05-08 DIAGNOSIS — R31.9 URINARY TRACT INFECTION WITH HEMATURIA, SITE UNSPECIFIED: ICD-10-CM

## 2019-05-08 DIAGNOSIS — R35.0 URINARY FREQUENCY: Primary | ICD-10-CM

## 2019-05-08 DIAGNOSIS — N39.0 URINARY TRACT INFECTION WITH HEMATURIA, SITE UNSPECIFIED: ICD-10-CM

## 2019-05-08 LAB
BACTERIA #/AREA URNS AUTO: ABNORMAL /HPF
BILIRUB UR QL STRIP: NEGATIVE
CLARITY UR REFRACT.AUTO: ABNORMAL
COLOR UR AUTO: ABNORMAL
GLUCOSE UR QL STRIP: ABNORMAL
HGB UR QL STRIP: ABNORMAL
HYALINE CASTS UR QL AUTO: 0 /LPF
KETONES UR QL STRIP: NEGATIVE
LEUKOCYTE ESTERASE UR QL STRIP: ABNORMAL
MICROSCOPIC COMMENT: ABNORMAL
NITRITE UR QL STRIP: NEGATIVE
PH UR STRIP: 6 [PH] (ref 5–8)
PROT UR QL STRIP: ABNORMAL
RBC #/AREA URNS AUTO: >100 /HPF (ref 0–4)
SP GR UR STRIP: 1.02 (ref 1–1.03)
URN SPEC COLLECT METH UR: ABNORMAL
WBC #/AREA URNS AUTO: 42 /HPF (ref 0–5)

## 2019-05-08 PROCEDURE — 87088 URINE BACTERIA CULTURE: CPT

## 2019-05-08 PROCEDURE — 99214 OFFICE O/P EST MOD 30 MIN: CPT | Mod: S$GLB,,, | Performed by: NURSE PRACTITIONER

## 2019-05-08 PROCEDURE — 1101F PR PT FALLS ASSESS DOC 0-1 FALLS W/OUT INJ PAST YR: ICD-10-PCS | Mod: CPTII,S$GLB,, | Performed by: NURSE PRACTITIONER

## 2019-05-08 PROCEDURE — 81001 URINALYSIS AUTO W/SCOPE: CPT | Mod: HCNC

## 2019-05-08 PROCEDURE — 99214 PR OFFICE/OUTPT VISIT, EST, LEVL IV, 30-39 MIN: ICD-10-PCS | Mod: S$GLB,,, | Performed by: NURSE PRACTITIONER

## 2019-05-08 PROCEDURE — 1101F PT FALLS ASSESS-DOCD LE1/YR: CPT | Mod: CPTII,S$GLB,, | Performed by: NURSE PRACTITIONER

## 2019-05-08 PROCEDURE — 87086 URINE CULTURE/COLONY COUNT: CPT | Mod: HCNC

## 2019-05-08 PROCEDURE — 99999 PR PBB SHADOW E&M-EST. PATIENT-LVL III: ICD-10-PCS | Mod: PBBFAC,HCNC,, | Performed by: NURSE PRACTITIONER

## 2019-05-08 PROCEDURE — 87077 CULTURE AEROBIC IDENTIFY: CPT

## 2019-05-08 PROCEDURE — 87186 SC STD MICRODIL/AGAR DIL: CPT

## 2019-05-08 PROCEDURE — 99999 PR PBB SHADOW E&M-EST. PATIENT-LVL III: CPT | Mod: PBBFAC,HCNC,, | Performed by: NURSE PRACTITIONER

## 2019-05-08 RX ORDER — CIPROFLOXACIN 250 MG/1
250 TABLET, FILM COATED ORAL 2 TIMES DAILY
Qty: 10 TABLET | Refills: 0 | Status: SHIPPED | OUTPATIENT
Start: 2019-05-08 | End: 2019-05-13

## 2019-05-08 NOTE — PATIENT INSTRUCTIONS
Drink lots of water and cranberry juice  AZO standard one every 8 hours as needed for burning and frequency on urination  UA and urine culture pending may need to change antibiotics  Cipro 250 one twice a day for next 5 days

## 2019-05-08 NOTE — PROGRESS NOTES
Subjective:       Patient ID: Mounika Barnett is a 68 y.o. female.    Chief Complaint: Urinary Tract Infection (Frequent urination/Dysuria   1 day)    68-year-old female presents to the clinic today with complaint of dysuria and urinary frequency since yesterday.  She states she had hematuria that started this morning.  She denies any fever, chills, difficulty urination, flank pain, abdominal pain, nausea, vomiting, or diarrhea.  She is eating and drinking without any difficulty.    Past Medical History:   Diagnosis Date    Osteoarthritis of both hands 5/3/2018     Past Surgical History:   Procedure Laterality Date     SECTION      DILATION AND CURETTAGE OF UTERUS      TONSILLECTOMY        reports that she has never smoked. She has never used smokeless tobacco. She reports that she does not drink alcohol or use drugs.  Review of Systems   Constitutional: Negative for chills and fever.   Gastrointestinal: Negative for abdominal pain, constipation, diarrhea, nausea and vomiting.   Genitourinary: Positive for dysuria, frequency and hematuria. Negative for difficulty urinating and flank pain.   Musculoskeletal: Negative for back pain and gait problem.       Objective:      Physical Exam   Constitutional: She is oriented to person, place, and time. She appears well-developed and well-nourished. No distress.   Eyes: Pupils are equal, round, and reactive to light. Conjunctivae and EOM are normal. Right eye exhibits no discharge. Left eye exhibits no discharge. No scleral icterus.   Neck: Normal range of motion. Neck supple.   Cardiovascular: Normal rate and regular rhythm. Exam reveals no gallop and no friction rub.   No murmur heard.  Pulmonary/Chest: Effort normal and breath sounds normal. No respiratory distress. She has no wheezes. She has no rales.   Abdominal: Soft. Bowel sounds are normal. There is tenderness.   Mild suprapubic tenderness no rebound or guarding noted    Genitourinary:    Genitourinary Comments: No CVA tendereness   Musculoskeletal: Normal range of motion. She exhibits no edema.   Neurological: She is alert and oriented to person, place, and time.   Skin: Skin is warm and dry. She is not diaphoretic.   Psychiatric: She has a normal mood and affect.       Assessment:       1. Urinary frequency    2. Dysuria    3. Hematuria, unspecified type    4. Urinary tract infection with hematuria, site unspecified        Plan:         Urinary frequency  -     Cancel: POCT urinalysis, dipstick or tablet reag  - Unable to dip UA due to too much blood     Dysuria  -     Cancel: POCT urinalysis, dipstick or tablet reag    Hematuria, unspecified type  - unable to dip Urine due to too much blood will send off UA and Urine C and S     Urinary tract infection with hematuria, site unspecified  -     Urinalysis  -     Urine culture  -     ciprofloxacin HCl (CIPRO) 250 MG tablet; Take 1 tablet (250 mg total) by mouth 2 (two) times daily. for 5 days  Dispense: 10 tablet; Refill: 0

## 2019-05-10 LAB — BACTERIA UR CULT: NORMAL

## 2019-09-11 ENCOUNTER — CLINICAL SUPPORT (OUTPATIENT)
Dept: FAMILY MEDICINE | Facility: CLINIC | Age: 69
End: 2019-09-11
Payer: MEDICARE

## 2019-09-11 ENCOUNTER — TELEPHONE (OUTPATIENT)
Dept: FAMILY MEDICINE | Facility: CLINIC | Age: 69
End: 2019-09-11

## 2019-09-11 DIAGNOSIS — Z23 NEED FOR INFLUENZA VACCINATION: Primary | ICD-10-CM

## 2019-09-11 PROCEDURE — 90662 IIV NO PRSV INCREASED AG IM: CPT | Mod: HCNC,S$GLB,, | Performed by: FAMILY MEDICINE

## 2019-09-11 PROCEDURE — 90662 FLU VACCINE - HIGH DOSE (65+) PRESERVATIVE FREE IM: ICD-10-PCS | Mod: HCNC,S$GLB,, | Performed by: FAMILY MEDICINE

## 2019-09-11 PROCEDURE — G0008 FLU VACCINE - HIGH DOSE (65+) PRESERVATIVE FREE IM: ICD-10-PCS | Mod: HCNC,S$GLB,, | Performed by: FAMILY MEDICINE

## 2019-09-11 PROCEDURE — 99499 UNLISTED E&M SERVICE: CPT | Mod: HCNC,S$GLB,, | Performed by: FAMILY MEDICINE

## 2019-09-11 PROCEDURE — 99499 NO LOS: ICD-10-PCS | Mod: HCNC,S$GLB,, | Performed by: FAMILY MEDICINE

## 2019-09-11 PROCEDURE — G0008 ADMIN INFLUENZA VIRUS VAC: HCPCS | Mod: HCNC,S$GLB,, | Performed by: FAMILY MEDICINE

## 2020-02-03 ENCOUNTER — PES CALL (OUTPATIENT)
Dept: ADMINISTRATIVE | Facility: CLINIC | Age: 70
End: 2020-02-03

## 2020-02-10 ENCOUNTER — PES CALL (OUTPATIENT)
Dept: ADMINISTRATIVE | Facility: CLINIC | Age: 70
End: 2020-02-10

## 2020-02-12 ENCOUNTER — PES CALL (OUTPATIENT)
Dept: ADMINISTRATIVE | Facility: CLINIC | Age: 70
End: 2020-02-12

## 2020-04-03 DIAGNOSIS — Z12.11 COLON CANCER SCREENING: ICD-10-CM

## 2020-04-14 ENCOUNTER — PATIENT MESSAGE (OUTPATIENT)
Dept: FAMILY MEDICINE | Facility: CLINIC | Age: 70
End: 2020-04-14

## 2020-04-14 DIAGNOSIS — Z12.11 COLON CANCER SCREENING: Primary | ICD-10-CM

## 2020-05-11 LAB — HEMOCCULT STL QL IA: NEGATIVE

## 2020-05-16 ENCOUNTER — PATIENT OUTREACH (OUTPATIENT)
Dept: ADMINISTRATIVE | Facility: HOSPITAL | Age: 70
End: 2020-05-16

## 2020-05-21 ENCOUNTER — PES CALL (OUTPATIENT)
Dept: ADMINISTRATIVE | Facility: CLINIC | Age: 70
End: 2020-05-21

## 2020-06-29 ENCOUNTER — PES CALL (OUTPATIENT)
Dept: ADMINISTRATIVE | Facility: CLINIC | Age: 70
End: 2020-06-29

## 2020-07-13 ENCOUNTER — PATIENT MESSAGE (OUTPATIENT)
Dept: ADMINISTRATIVE | Facility: HOSPITAL | Age: 70
End: 2020-07-13

## 2020-07-16 ENCOUNTER — PATIENT MESSAGE (OUTPATIENT)
Dept: ADMINISTRATIVE | Facility: HOSPITAL | Age: 70
End: 2020-07-16

## 2020-08-07 ENCOUNTER — PATIENT MESSAGE (OUTPATIENT)
Dept: ADMINISTRATIVE | Facility: HOSPITAL | Age: 70
End: 2020-08-07

## 2020-08-07 ENCOUNTER — PATIENT OUTREACH (OUTPATIENT)
Dept: ADMINISTRATIVE | Facility: HOSPITAL | Age: 70
End: 2020-08-07

## 2020-08-07 DIAGNOSIS — N95.8 OTHER SPECIFIED MENOPAUSAL AND PERIMENOPAUSAL DISORDERS: ICD-10-CM

## 2020-08-07 DIAGNOSIS — Z12.31 ENCOUNTER FOR SCREENING MAMMOGRAM FOR MALIGNANT NEOPLASM OF BREAST: Primary | ICD-10-CM

## 2020-08-10 ENCOUNTER — PATIENT MESSAGE (OUTPATIENT)
Dept: ADMINISTRATIVE | Facility: HOSPITAL | Age: 70
End: 2020-08-10

## 2020-08-10 ENCOUNTER — HOSPITAL ENCOUNTER (OUTPATIENT)
Dept: RADIOLOGY | Facility: HOSPITAL | Age: 70
Discharge: HOME | End: 2020-08-10
Attending: FAMILY MEDICINE
Payer: MEDICARE

## 2020-08-10 VITALS — BODY MASS INDEX: 16.01 KG/M2 | WEIGHT: 87 LBS | HEIGHT: 62 IN

## 2020-08-10 DIAGNOSIS — Z12.31 ENCOUNTER FOR SCREENING MAMMOGRAM FOR MALIGNANT NEOPLASM OF BREAST: ICD-10-CM

## 2020-08-10 PROCEDURE — 77067 SCR MAMMO BI INCL CAD: CPT | Mod: TC,HCNC,PO

## 2020-08-10 PROCEDURE — 77067 SCR MAMMO BI INCL CAD: CPT | Mod: 26,HCNC,, | Performed by: RADIOLOGY

## 2020-08-10 PROCEDURE — 77063 MAMMO DIGITAL SCREENING BILAT WITH TOMOSYNTHESIS_CAD: ICD-10-PCS | Mod: 26,HCNC,, | Performed by: RADIOLOGY

## 2020-08-10 PROCEDURE — 77067 MAMMO DIGITAL SCREENING BILAT WITH TOMOSYNTHESIS_CAD: ICD-10-PCS | Mod: 26,HCNC,, | Performed by: RADIOLOGY

## 2020-08-10 PROCEDURE — 77063 BREAST TOMOSYNTHESIS BI: CPT | Mod: 26,HCNC,, | Performed by: RADIOLOGY

## 2020-08-12 ENCOUNTER — HOSPITAL ENCOUNTER (OUTPATIENT)
Dept: RADIOLOGY | Facility: HOSPITAL | Age: 70
Discharge: HOME | End: 2020-08-12
Attending: FAMILY MEDICINE
Payer: MEDICARE

## 2020-08-12 DIAGNOSIS — N95.8 OTHER SPECIFIED MENOPAUSAL AND PERIMENOPAUSAL DISORDERS: ICD-10-CM

## 2020-08-12 PROCEDURE — 77080 DXA BONE DENSITY AXIAL: CPT | Mod: 26,HCNC,, | Performed by: RADIOLOGY

## 2020-08-12 PROCEDURE — 77080 DEXA BONE DENSITY SPINE HIP: ICD-10-PCS | Mod: 26,HCNC,, | Performed by: RADIOLOGY

## 2020-08-12 PROCEDURE — 77080 DXA BONE DENSITY AXIAL: CPT | Mod: TC,HCNC

## 2020-08-21 ENCOUNTER — OFFICE VISIT (OUTPATIENT)
Dept: FAMILY MEDICINE | Facility: CLINIC | Age: 70
End: 2020-08-21
Payer: MEDICARE

## 2020-08-21 ENCOUNTER — LAB VISIT (OUTPATIENT)
Dept: LAB | Facility: HOSPITAL | Age: 70
End: 2020-08-21
Attending: FAMILY MEDICINE
Payer: MEDICARE

## 2020-08-21 VITALS
SYSTOLIC BLOOD PRESSURE: 120 MMHG | OXYGEN SATURATION: 96 % | BODY MASS INDEX: 15.46 KG/M2 | HEART RATE: 76 BPM | DIASTOLIC BLOOD PRESSURE: 80 MMHG | TEMPERATURE: 98 F | WEIGHT: 84 LBS | HEIGHT: 62 IN

## 2020-08-21 DIAGNOSIS — Z00.00 ANNUAL PHYSICAL EXAM: ICD-10-CM

## 2020-08-21 DIAGNOSIS — E44.1 MILD PROTEIN-CALORIE MALNUTRITION: ICD-10-CM

## 2020-08-21 DIAGNOSIS — Z00.00 ANNUAL PHYSICAL EXAM: Primary | ICD-10-CM

## 2020-08-21 LAB
ALBUMIN SERPL BCP-MCNC: 4.2 G/DL (ref 3.5–5.2)
ALP SERPL-CCNC: 99 U/L (ref 55–135)
ALT SERPL W/O P-5'-P-CCNC: 21 U/L (ref 10–44)
ANION GAP SERPL CALC-SCNC: 7 MMOL/L (ref 8–16)
AST SERPL-CCNC: 24 U/L (ref 10–40)
BASOPHILS # BLD AUTO: 0.03 K/UL (ref 0–0.2)
BASOPHILS NFR BLD: 0.6 % (ref 0–1.9)
BILIRUB SERPL-MCNC: 0.6 MG/DL (ref 0.1–1)
BUN SERPL-MCNC: 15 MG/DL (ref 8–23)
CALCIUM SERPL-MCNC: 10 MG/DL (ref 8.7–10.5)
CHLORIDE SERPL-SCNC: 104 MMOL/L (ref 95–110)
CHOLEST SERPL-MCNC: 192 MG/DL (ref 120–199)
CHOLEST/HDLC SERPL: 2.3 {RATIO} (ref 2–5)
CO2 SERPL-SCNC: 29 MMOL/L (ref 23–29)
CREAT SERPL-MCNC: 0.7 MG/DL (ref 0.5–1.4)
DIFFERENTIAL METHOD: ABNORMAL
EOSINOPHIL # BLD AUTO: 0.1 K/UL (ref 0–0.5)
EOSINOPHIL NFR BLD: 1.7 % (ref 0–8)
ERYTHROCYTE [DISTWIDTH] IN BLOOD BY AUTOMATED COUNT: 12.1 % (ref 11.5–14.5)
EST. GFR  (AFRICAN AMERICAN): >60 ML/MIN/1.73 M^2
EST. GFR  (NON AFRICAN AMERICAN): >60 ML/MIN/1.73 M^2
GLUCOSE SERPL-MCNC: 90 MG/DL (ref 70–110)
HCT VFR BLD AUTO: 48 % (ref 37–48.5)
HDLC SERPL-MCNC: 85 MG/DL (ref 40–75)
HDLC SERPL: 44.3 % (ref 20–50)
HGB BLD-MCNC: 16.1 G/DL (ref 12–16)
IMM GRANULOCYTES # BLD AUTO: 0.01 K/UL (ref 0–0.04)
IMM GRANULOCYTES NFR BLD AUTO: 0.2 % (ref 0–0.5)
LDLC SERPL CALC-MCNC: 94.2 MG/DL (ref 63–159)
LYMPHOCYTES # BLD AUTO: 1.2 K/UL (ref 1–4.8)
LYMPHOCYTES NFR BLD: 24.6 % (ref 18–48)
MCH RBC QN AUTO: 32.5 PG (ref 27–31)
MCHC RBC AUTO-ENTMCNC: 33.5 G/DL (ref 32–36)
MCV RBC AUTO: 97 FL (ref 82–98)
MONOCYTES # BLD AUTO: 0.5 K/UL (ref 0.3–1)
MONOCYTES NFR BLD: 11.6 % (ref 4–15)
NEUTROPHILS # BLD AUTO: 2.9 K/UL (ref 1.8–7.7)
NEUTROPHILS NFR BLD: 61.3 % (ref 38–73)
NONHDLC SERPL-MCNC: 107 MG/DL
NRBC BLD-RTO: 0 /100 WBC
PLATELET # BLD AUTO: 294 K/UL (ref 150–350)
PMV BLD AUTO: 11.1 FL (ref 9.2–12.9)
POTASSIUM SERPL-SCNC: 4.4 MMOL/L (ref 3.5–5.1)
PROT SERPL-MCNC: 7.4 G/DL (ref 6–8.4)
RBC # BLD AUTO: 4.95 M/UL (ref 4–5.4)
SODIUM SERPL-SCNC: 140 MMOL/L (ref 136–145)
TRIGL SERPL-MCNC: 64 MG/DL (ref 30–150)
TSH SERPL DL<=0.005 MIU/L-ACNC: 2 UIU/ML (ref 0.4–4)
WBC # BLD AUTO: 4.67 K/UL (ref 3.9–12.7)

## 2020-08-21 PROCEDURE — 99999 PR PBB SHADOW E&M-EST. PATIENT-LVL III: CPT | Mod: PBBFAC,HCNC,, | Performed by: FAMILY MEDICINE

## 2020-08-21 PROCEDURE — 84443 ASSAY THYROID STIM HORMONE: CPT | Mod: HCNC

## 2020-08-21 PROCEDURE — 36415 COLL VENOUS BLD VENIPUNCTURE: CPT | Mod: HCNC,PO

## 2020-08-21 PROCEDURE — 99999 PR PBB SHADOW E&M-EST. PATIENT-LVL III: ICD-10-PCS | Mod: PBBFAC,HCNC,, | Performed by: FAMILY MEDICINE

## 2020-08-21 PROCEDURE — 80053 COMPREHEN METABOLIC PANEL: CPT | Mod: HCNC

## 2020-08-21 PROCEDURE — 85025 COMPLETE CBC W/AUTO DIFF WBC: CPT | Mod: HCNC

## 2020-08-21 PROCEDURE — 99499 UNLISTED E&M SERVICE: CPT | Mod: S$GLB,,, | Performed by: FAMILY MEDICINE

## 2020-08-21 PROCEDURE — 99499 RISK ADDL DX/OHS AUDIT: ICD-10-PCS | Mod: S$GLB,,, | Performed by: FAMILY MEDICINE

## 2020-08-21 PROCEDURE — 99397 PR PREVENTIVE VISIT,EST,65 & OVER: ICD-10-PCS | Mod: HCNC,S$GLB,, | Performed by: FAMILY MEDICINE

## 2020-08-21 PROCEDURE — 99397 PER PM REEVAL EST PAT 65+ YR: CPT | Mod: HCNC,S$GLB,, | Performed by: FAMILY MEDICINE

## 2020-08-21 PROCEDURE — 80061 LIPID PANEL: CPT | Mod: HCNC

## 2020-08-21 NOTE — PROGRESS NOTES
Assessment & Plan  Problem List Items Addressed This Visit        Endocrine    Mild protein-calorie malnutrition    Relevant Orders    Comprehensive metabolic panel (Completed)    CBC auto differential (Completed)    Lipid Panel (Completed)    TSH (Completed)      Other Visit Diagnoses     Annual physical exam    -  Primary    Relevant Orders    Comprehensive metabolic panel (Completed)    CBC auto differential (Completed)    Lipid Panel (Completed)    TSH (Completed)            Health Maintenance reviewed    Follow-up: No follow-ups on file.    ______________________________________________________________________    Chief Complaint  Chief Complaint   Patient presents with    Annual Exam       HPI  Mounika Barnett is a 69 y.o. female with multiple medical diagnoses as listed in the medical history and problem list that presents for annual exam.  Pt is known to me with last appointment 3/21/2019.  Patient denies any new symptoms including chest pain, SOB, blurry vision, N/V, diarrhea.  Discuss current diet.  No recent falls       PAST MEDICAL HISTORY:  Past Medical History:   Diagnosis Date    Osteoarthritis of both hands 5/3/2018       PAST SURGICAL HISTORY:  Past Surgical History:   Procedure Laterality Date     SECTION      DILATION AND CURETTAGE OF UTERUS      TONSILLECTOMY         SOCIAL HISTORY:  Social History     Socioeconomic History    Marital status:      Spouse name: Not on file    Number of children: Not on file    Years of education: Not on file    Highest education level: Not on file   Occupational History    Not on file   Social Needs    Financial resource strain: Not hard at all    Food insecurity     Worry: Never true     Inability: Never true    Transportation needs     Medical: No     Non-medical: No   Tobacco Use    Smoking status: Never Smoker    Smokeless tobacco: Never Used   Substance and Sexual Activity    Alcohol use: No     Alcohol/week: 0.0  standard drinks     Frequency: Never     Drinks per session: Patient refused     Binge frequency: Never    Drug use: No    Sexual activity: Never     Partners: Male   Lifestyle    Physical activity     Days per week: 7 days     Minutes per session: 90 min    Stress: To some extent   Relationships    Social connections     Talks on phone: Three times a week     Gets together: Twice a week     Attends Hinduism service: Not on file     Active member of club or organization: No     Attends meetings of clubs or organizations: Never     Relationship status:    Other Topics Concern    Not on file   Social History Narrative    Not on file       FAMILY HISTORY:  Family History   Problem Relation Age of Onset    Hypertension Mother     Heart disease Mother     Cancer Father         liver CA    Hypertension Brother        ALLERGIES AND MEDICATIONS: updated and reviewed.  Review of patient's allergies indicates:  No Known Allergies  Current Outpatient Medications   Medication Sig Dispense Refill    calcium citrate/vitamin D2 (CALCIUM CITRATE WITH D ORAL) Take 1 tablet by mouth 2 (two) times daily. 600 mg+D3 20 mcg=800 IU       No current facility-administered medications for this visit.          ROS  Review of Systems   Constitutional: Negative for activity change and unexpected weight change.   HENT: Negative for hearing loss, rhinorrhea and trouble swallowing.    Eyes: Negative for discharge and visual disturbance.   Respiratory: Negative for chest tightness and wheezing.    Cardiovascular: Negative for chest pain and palpitations.   Gastrointestinal: Negative for blood in stool, constipation, diarrhea and vomiting.   Endocrine: Negative for polydipsia and polyuria.   Genitourinary: Negative for difficulty urinating, dysuria, hematuria and menstrual problem.   Musculoskeletal: Negative for joint swelling and neck pain.   Neurological: Negative for weakness and headaches.   Psychiatric/Behavioral: Positive  "for dysphoric mood. Negative for confusion.           Physical Exam  Vitals:    08/21/20 0753   BP: 120/80   BP Location: Left arm   Patient Position: Sitting   BP Method: Small (Manual)   Pulse: 76   Temp: 97.9 °F (36.6 °C)   TempSrc: Temporal   SpO2: 96%   Weight: 38.1 kg (83 lb 15.9 oz)   Height: 5' 2" (1.575 m)    Body mass index is 15.36 kg/m².  Weight: 38.1 kg (83 lb 15.9 oz)   Height: 5' 2" (157.5 cm)   Physical Exam  Vitals signs reviewed.   Constitutional:       Appearance: She is well-developed.   HENT:      Head: Normocephalic and atraumatic.      Right Ear: External ear normal.      Left Ear: External ear normal.      Nose: Nose normal.   Eyes:      Conjunctiva/sclera: Conjunctivae normal.      Pupils: Pupils are equal, round, and reactive to light.   Cardiovascular:      Rate and Rhythm: Normal rate and regular rhythm.      Heart sounds: Normal heart sounds.   Pulmonary:      Effort: Pulmonary effort is normal.      Breath sounds: Normal breath sounds.   Skin:     General: Skin is warm and dry.   Neurological:      Mental Status: She is alert and oriented to person, place, and time.           Health Maintenance       Date Due Completion Date    Shingles Vaccine (1 of 2) 10/04/2001 ---    Influenza Vaccine (1) 09/01/2020 9/11/2019    Mammogram 08/10/2022 8/10/2020    Colorectal Cancer Screening 05/11/2023 5/11/2020    DEXA SCAN 08/12/2023 8/12/2020    Lipid Panel 03/21/2024 3/21/2019    TETANUS VACCINE 05/03/2028 5/3/2018 (Declined)    Override on 5/3/2018: Declined              Patient note was created using [employer].  Any errors in syntax or even information may not have been identified and edited on initial review prior to signing this note.    "

## 2020-09-15 ENCOUNTER — PATIENT MESSAGE (OUTPATIENT)
Dept: FAMILY MEDICINE | Facility: CLINIC | Age: 70
End: 2020-09-15

## 2020-09-15 ENCOUNTER — CLINICAL SUPPORT (OUTPATIENT)
Dept: FAMILY MEDICINE | Facility: CLINIC | Age: 70
End: 2020-09-15
Payer: MEDICARE

## 2020-09-15 DIAGNOSIS — Z23 NEED FOR INFLUENZA VACCINATION: Primary | ICD-10-CM

## 2020-09-15 PROCEDURE — 99999 PR PBB SHADOW E&M-EST. PATIENT-LVL I: ICD-10-PCS | Mod: PBBFAC,HCNC,,

## 2020-09-15 PROCEDURE — 90694 FLU VACCINE - QUADRIVALENT - ADJUVANTED: ICD-10-PCS | Mod: HCNC,S$GLB,ICN, | Performed by: FAMILY MEDICINE

## 2020-09-15 PROCEDURE — G0008 PR ADMIN INFLUENZA VIRUS VAC: ICD-10-PCS | Mod: HCNC,S$GLB,ICN, | Performed by: FAMILY MEDICINE

## 2020-09-15 PROCEDURE — 99999 PR PBB SHADOW E&M-EST. PATIENT-LVL I: CPT | Mod: PBBFAC,HCNC,,

## 2020-09-15 PROCEDURE — 90694 VACC AIIV4 NO PRSRV 0.5ML IM: CPT | Mod: HCNC,S$GLB,ICN, | Performed by: FAMILY MEDICINE

## 2020-09-15 PROCEDURE — G0008 ADMIN INFLUENZA VIRUS VAC: HCPCS | Mod: HCNC,S$GLB,ICN, | Performed by: FAMILY MEDICINE

## 2020-09-15 NOTE — PROGRESS NOTES
Pt in clinic for flu vaccine. Name and  verified. Allergies reviewed. Administered flu vaccine to pt in left deltoid. Pt tolerated well.

## 2021-02-11 ENCOUNTER — PES CALL (OUTPATIENT)
Dept: ADMINISTRATIVE | Facility: CLINIC | Age: 71
End: 2021-02-11

## 2021-04-30 NOTE — PROGRESS NOTES
Office Visit    Patient Name: Mounika Barnett    : 1950  MRN: [id number]    Subjective:  Mounika Howell is a 66 y.o. female who presents today for:    Annual Exam      This patient has multiple medical diagnoses as noted below.  This patient is known to me and to this clinic. She reports that her blood pressure has been normal at home.  Patient denies any new symptoms including chest pain, SOB, blurry vision, N/V, diarrhea.   She will periodically use ibuprofen for pain.  She has reading of her blood pressure ranging from 112-134/67-79.        Active Problem List  There is no problem list on file for this patient.      Past Surgical History  Past Surgical History   Procedure Laterality Date     section      Tonsillectomy      Dilation and curettage of uterus         Family History  Family History   Problem Relation Age of Onset    Hypertension Mother     Heart disease Mother     Cancer Father        Social History  Social History     Social History    Marital status:      Spouse name: N/A    Number of children: N/A    Years of education: N/A     Occupational History    Not on file.     Social History Main Topics    Smoking status: Never Smoker    Smokeless tobacco: Not on file    Alcohol use No    Drug use: No    Sexual activity: No     Other Topics Concern    Not on file     Social History Narrative    No narrative on file       Current Medications  Medications reviewed and updated.     Allergies   Review of patient's allergies indicates:  No Known Allergies    Review of Systems (Pertinent positives)  Review of Systems   Constitutional: Negative for activity change, appetite change, fatigue, fever and unexpected weight change.   HENT: Negative.  Negative for ear discharge, ear pain, rhinorrhea and sore throat.    Eyes: Negative.    Respiratory: Negative for apnea, cough, chest tightness, shortness of breath and wheezing.    Cardiovascular: Negative for chest pain,  "palpitations and leg swelling.   Gastrointestinal: Negative for abdominal distention, abdominal pain, constipation, diarrhea and vomiting.   Endocrine: Negative for cold intolerance, heat intolerance, polydipsia and polyuria.   Genitourinary: Negative for decreased urine volume, menstrual problem, urgency, vaginal bleeding, vaginal discharge and vaginal pain.   Musculoskeletal: Negative.    Skin: Negative for rash.   Neurological: Negative for dizziness and headaches.   Hematological: Does not bruise/bleed easily.   Psychiatric/Behavioral: Negative for agitation, sleep disturbance and suicidal ideas.       Visit Vitals    BP (!) 170/84 (BP Location: Left arm, Patient Position: Sitting, BP Method: Manual)    Pulse 63    Temp 98 °F (36.7 °C) (Oral)    Ht 5' 2" (1.575 m)    Wt 41.7 kg (91 lb 14.9 oz)    SpO2 97%    BMI 16.81 kg/m2       Physical Exam   Constitutional: She is oriented to person, place, and time. She appears well-developed and well-nourished.   HENT:   Head: Normocephalic and atraumatic.   Right Ear: External ear normal.   Left Ear: External ear normal.   Nose: Nose normal.   Mouth/Throat: Oropharynx is clear and moist.   Eyes: Conjunctivae and EOM are normal. Pupils are equal, round, and reactive to light.   Neck: Normal range of motion. No JVD present. No thyromegaly present.   Cardiovascular: Normal rate, regular rhythm and normal heart sounds.    Pulmonary/Chest: Effort normal and breath sounds normal. She has no wheezes. Right breast exhibits no inverted nipple, no mass, no nipple discharge, no skin change and no tenderness. Left breast exhibits no inverted nipple, no mass, no nipple discharge, no skin change and no tenderness.   Abdominal: Soft. Bowel sounds are normal. She exhibits no distension. There is no tenderness.   Genitourinary: Vagina normal and uterus normal. Pelvic exam was performed with patient supine. There is no rash, tenderness or lesion on the right labia. There is no rash, " tenderness, lesion or injury on the left labia. Cervix exhibits no discharge and no friability. Right adnexum displays no mass, no tenderness and no fullness. Left adnexum displays no mass, no tenderness and no fullness.   Musculoskeletal: Normal range of motion.   Lymphadenopathy:     She has no cervical adenopathy.   Neurological: She is alert and oriented to person, place, and time. She has normal reflexes.   Skin: Skin is warm and dry.   Psychiatric: She has a normal mood and affect. Her behavior is normal. Judgment and thought content normal.   Vitals reviewed.      Health Maintenance  Health Maintenance Topics with due status: Not Due       Topic Last Completion Date    Lipid Panel 01/26/2016    Mammogram 01/30/2017    DEXA SCAN 01/30/2017       Assessment/Plan:  Mounika Barnett is a 66 y.o. female who presents today for :    Mounika Howell was seen today for annual exam.    Diagnoses and all orders for this visit:    Annual physical exam  -  Up to date on screening     Disorder of bone and articular cartilage  -     DXA Bone Density Spine And Hip; Future    Colon cancer screening  -     Occult blood x 1, stool; Future  -     Occult blood x 1, stool; Future  -     Occult blood x 1, stool; Future    Cervical cancer screening  -     Liquid-based pap smear, screening    Encounter for screening mammogram for breast cancer  -     Cancel: Mammo Digital Screening Bilateral With CAD; Future    Need for hepatitis C screening test  -     Hepatitis C antibody; Future    Weakness  -     Occult blood x 1, stool; Future  -     Occult blood x 1, stool; Future  -     Occult blood x 1, stool; Future    Other orders  -     Cancel: Case request GI        No Follow-up on file.   electronic

## 2021-08-02 ENCOUNTER — TELEPHONE (OUTPATIENT)
Dept: FAMILY MEDICINE | Facility: CLINIC | Age: 71
End: 2021-08-02

## 2021-09-16 ENCOUNTER — PES CALL (OUTPATIENT)
Dept: ADMINISTRATIVE | Facility: CLINIC | Age: 71
End: 2021-09-16

## 2021-10-05 ENCOUNTER — TELEPHONE (OUTPATIENT)
Dept: FAMILY MEDICINE | Facility: CLINIC | Age: 71
End: 2021-10-05

## 2021-12-13 ENCOUNTER — LAB VISIT (OUTPATIENT)
Dept: LAB | Facility: HOSPITAL | Age: 71
End: 2021-12-13
Attending: FAMILY MEDICINE
Payer: MEDICARE

## 2021-12-13 ENCOUNTER — OFFICE VISIT (OUTPATIENT)
Dept: FAMILY MEDICINE | Facility: CLINIC | Age: 71
End: 2021-12-13
Payer: MEDICARE

## 2021-12-13 VITALS
BODY MASS INDEX: 15.86 KG/M2 | DIASTOLIC BLOOD PRESSURE: 80 MMHG | HEIGHT: 62 IN | TEMPERATURE: 98 F | OXYGEN SATURATION: 99 % | HEART RATE: 68 BPM | SYSTOLIC BLOOD PRESSURE: 122 MMHG | WEIGHT: 86.19 LBS

## 2021-12-13 DIAGNOSIS — E44.1 MILD PROTEIN-CALORIE MALNUTRITION: ICD-10-CM

## 2021-12-13 DIAGNOSIS — F39 MOOD DISORDER: ICD-10-CM

## 2021-12-13 DIAGNOSIS — Z00.00 ANNUAL PHYSICAL EXAM: Primary | ICD-10-CM

## 2021-12-13 DIAGNOSIS — Z00.00 ANNUAL PHYSICAL EXAM: ICD-10-CM

## 2021-12-13 DIAGNOSIS — Z23 NEED FOR SHINGLES VACCINE: ICD-10-CM

## 2021-12-13 DIAGNOSIS — Z12.31 ENCOUNTER FOR SCREENING MAMMOGRAM FOR BREAST CANCER: ICD-10-CM

## 2021-12-13 DIAGNOSIS — Z13.9 ENCOUNTER FOR SCREENING: ICD-10-CM

## 2021-12-13 LAB
ALBUMIN SERPL BCP-MCNC: 4.1 G/DL (ref 3.5–5.2)
ALP SERPL-CCNC: 96 U/L (ref 55–135)
ALT SERPL W/O P-5'-P-CCNC: 25 U/L (ref 10–44)
ANION GAP SERPL CALC-SCNC: 14 MMOL/L (ref 8–16)
AST SERPL-CCNC: 26 U/L (ref 10–40)
BASOPHILS # BLD AUTO: 0.04 K/UL (ref 0–0.2)
BASOPHILS NFR BLD: 1 % (ref 0–1.9)
BILIRUB SERPL-MCNC: 0.7 MG/DL (ref 0.1–1)
BUN SERPL-MCNC: 13 MG/DL (ref 8–23)
CALCIUM SERPL-MCNC: 10 MG/DL (ref 8.7–10.5)
CHLORIDE SERPL-SCNC: 103 MMOL/L (ref 95–110)
CHOLEST SERPL-MCNC: 190 MG/DL (ref 120–199)
CHOLEST/HDLC SERPL: 2.3 {RATIO} (ref 2–5)
CO2 SERPL-SCNC: 23 MMOL/L (ref 23–29)
CREAT SERPL-MCNC: 0.7 MG/DL (ref 0.5–1.4)
DIFFERENTIAL METHOD: ABNORMAL
EOSINOPHIL # BLD AUTO: 0.1 K/UL (ref 0–0.5)
EOSINOPHIL NFR BLD: 2.7 % (ref 0–8)
ERYTHROCYTE [DISTWIDTH] IN BLOOD BY AUTOMATED COUNT: 12.3 % (ref 11.5–14.5)
EST. GFR  (AFRICAN AMERICAN): >60 ML/MIN/1.73 M^2
EST. GFR  (NON AFRICAN AMERICAN): >60 ML/MIN/1.73 M^2
GLUCOSE SERPL-MCNC: 90 MG/DL (ref 70–110)
HCT VFR BLD AUTO: 47.5 % (ref 37–48.5)
HDLC SERPL-MCNC: 83 MG/DL (ref 40–75)
HDLC SERPL: 43.7 % (ref 20–50)
HGB BLD-MCNC: 15.8 G/DL (ref 12–16)
IMM GRANULOCYTES # BLD AUTO: 0.01 K/UL (ref 0–0.04)
IMM GRANULOCYTES NFR BLD AUTO: 0.2 % (ref 0–0.5)
LDLC SERPL CALC-MCNC: 93.8 MG/DL (ref 63–159)
LYMPHOCYTES # BLD AUTO: 0.9 K/UL (ref 1–4.8)
LYMPHOCYTES NFR BLD: 22 % (ref 18–48)
MCH RBC QN AUTO: 32.5 PG (ref 27–31)
MCHC RBC AUTO-ENTMCNC: 33.3 G/DL (ref 32–36)
MCV RBC AUTO: 98 FL (ref 82–98)
MONOCYTES # BLD AUTO: 0.5 K/UL (ref 0.3–1)
MONOCYTES NFR BLD: 12.3 % (ref 4–15)
NEUTROPHILS # BLD AUTO: 2.6 K/UL (ref 1.8–7.7)
NEUTROPHILS NFR BLD: 61.8 % (ref 38–73)
NONHDLC SERPL-MCNC: 107 MG/DL
NRBC BLD-RTO: 0 /100 WBC
PLATELET # BLD AUTO: 292 K/UL (ref 150–450)
PMV BLD AUTO: 10.6 FL (ref 9.2–12.9)
POTASSIUM SERPL-SCNC: 4.5 MMOL/L (ref 3.5–5.1)
PROT SERPL-MCNC: 7.5 G/DL (ref 6–8.4)
RBC # BLD AUTO: 4.86 M/UL (ref 4–5.4)
SODIUM SERPL-SCNC: 140 MMOL/L (ref 136–145)
TRIGL SERPL-MCNC: 66 MG/DL (ref 30–150)
TSH SERPL DL<=0.005 MIU/L-ACNC: 2.02 UIU/ML (ref 0.4–4)
WBC # BLD AUTO: 4.14 K/UL (ref 3.9–12.7)

## 2021-12-13 PROCEDURE — 80061 LIPID PANEL: CPT | Mod: HCNC | Performed by: FAMILY MEDICINE

## 2021-12-13 PROCEDURE — 99397 PER PM REEVAL EST PAT 65+ YR: CPT | Mod: HCNC,S$GLB,, | Performed by: FAMILY MEDICINE

## 2021-12-13 PROCEDURE — 99397 PR PREVENTIVE VISIT,EST,65 & OVER: ICD-10-PCS | Mod: HCNC,S$GLB,, | Performed by: FAMILY MEDICINE

## 2021-12-13 PROCEDURE — 85025 COMPLETE CBC W/AUTO DIFF WBC: CPT | Mod: HCNC | Performed by: FAMILY MEDICINE

## 2021-12-13 PROCEDURE — 36415 COLL VENOUS BLD VENIPUNCTURE: CPT | Mod: HCNC,PO | Performed by: FAMILY MEDICINE

## 2021-12-13 PROCEDURE — 99499 UNLISTED E&M SERVICE: CPT | Mod: S$GLB,,, | Performed by: FAMILY MEDICINE

## 2021-12-13 PROCEDURE — 84443 ASSAY THYROID STIM HORMONE: CPT | Mod: HCNC | Performed by: FAMILY MEDICINE

## 2021-12-13 PROCEDURE — 99499 RISK ADDL DX/OHS AUDIT: ICD-10-PCS | Mod: S$GLB,,, | Performed by: FAMILY MEDICINE

## 2021-12-13 PROCEDURE — 99999 PR PBB SHADOW E&M-EST. PATIENT-LVL IV: CPT | Mod: PBBFAC,HCNC,, | Performed by: FAMILY MEDICINE

## 2021-12-13 PROCEDURE — 80053 COMPREHEN METABOLIC PANEL: CPT | Mod: HCNC | Performed by: FAMILY MEDICINE

## 2021-12-13 PROCEDURE — 99999 PR PBB SHADOW E&M-EST. PATIENT-LVL IV: ICD-10-PCS | Mod: PBBFAC,HCNC,, | Performed by: FAMILY MEDICINE

## 2021-12-13 RX ORDER — ESCITALOPRAM OXALATE 5 MG/1
5 TABLET ORAL DAILY
Qty: 90 TABLET | Refills: 0 | Status: SHIPPED | OUTPATIENT
Start: 2021-12-13 | End: 2022-09-19 | Stop reason: SDUPTHER

## 2021-12-13 RX ORDER — INFLUENZA A VIRUS A/VICTORIA/2570/2019 IVR-215 (H1N1) ANTIGEN (FORMALDEHYDE INACTIVATED), INFLUENZA A VIRUS A/TASMANIA/503/2020 IVR-221 (H3N2) ANTIGEN (FORMALDEHYDE INACTIVATED), INFLUENZA B VIRUS B/PHUKET/3073/2013 ANTIGEN (FORMALDEHYDE INACTIVATED), AND INFLUENZA B VIRUS B/WASHINGTON/02/2019 ANTIGEN (FORMALDEHYDE INACTIVATED) 60; 60; 60; 60 UG/.7ML; UG/.7ML; UG/.7ML; UG/.7ML
INJECTION, SUSPENSION INTRAMUSCULAR
COMMUNITY
Start: 2021-10-18

## 2021-12-14 ENCOUNTER — HOSPITAL ENCOUNTER (OUTPATIENT)
Dept: RADIOLOGY | Facility: HOSPITAL | Age: 71
Discharge: HOME | End: 2021-12-14
Attending: FAMILY MEDICINE
Payer: MEDICARE

## 2021-12-14 DIAGNOSIS — Z12.31 ENCOUNTER FOR SCREENING MAMMOGRAM FOR BREAST CANCER: ICD-10-CM

## 2021-12-14 PROCEDURE — 77067 SCR MAMMO BI INCL CAD: CPT | Mod: TC,HCNC,PO

## 2021-12-14 PROCEDURE — 77063 MAMMO DIGITAL SCREENING BILAT WITH TOMO: ICD-10-PCS | Mod: 26,HCNC,, | Performed by: RADIOLOGY

## 2021-12-14 PROCEDURE — 77063 BREAST TOMOSYNTHESIS BI: CPT | Mod: 26,HCNC,, | Performed by: RADIOLOGY

## 2021-12-14 PROCEDURE — 77067 MAMMO DIGITAL SCREENING BILAT WITH TOMO: ICD-10-PCS | Mod: 26,HCNC,, | Performed by: RADIOLOGY

## 2021-12-14 PROCEDURE — 77067 SCR MAMMO BI INCL CAD: CPT | Mod: 26,HCNC,, | Performed by: RADIOLOGY

## 2022-08-22 ENCOUNTER — PES CALL (OUTPATIENT)
Dept: ADMINISTRATIVE | Facility: CLINIC | Age: 72
End: 2022-08-22
Payer: MEDICARE

## 2022-09-19 ENCOUNTER — OFFICE VISIT (OUTPATIENT)
Dept: FAMILY MEDICINE | Facility: CLINIC | Age: 72
End: 2022-09-19
Payer: MEDICARE

## 2022-09-19 VITALS
OXYGEN SATURATION: 99 % | DIASTOLIC BLOOD PRESSURE: 80 MMHG | TEMPERATURE: 98 F | HEART RATE: 69 BPM | BODY MASS INDEX: 15.91 KG/M2 | WEIGHT: 86.44 LBS | SYSTOLIC BLOOD PRESSURE: 118 MMHG | HEIGHT: 62 IN

## 2022-09-19 DIAGNOSIS — F39 MOOD DISORDER: ICD-10-CM

## 2022-09-19 PROCEDURE — 3079F DIAST BP 80-89 MM HG: CPT | Mod: CPTII,S$GLB,, | Performed by: FAMILY MEDICINE

## 2022-09-19 PROCEDURE — 3288F PR FALLS RISK ASSESSMENT DOCUMENTED: ICD-10-PCS | Mod: CPTII,S$GLB,, | Performed by: FAMILY MEDICINE

## 2022-09-19 PROCEDURE — 3074F SYST BP LT 130 MM HG: CPT | Mod: CPTII,S$GLB,, | Performed by: FAMILY MEDICINE

## 2022-09-19 PROCEDURE — 3288F FALL RISK ASSESSMENT DOCD: CPT | Mod: CPTII,S$GLB,, | Performed by: FAMILY MEDICINE

## 2022-09-19 PROCEDURE — 3008F BODY MASS INDEX DOCD: CPT | Mod: CPTII,S$GLB,, | Performed by: FAMILY MEDICINE

## 2022-09-19 PROCEDURE — 1101F PR PT FALLS ASSESS DOC 0-1 FALLS W/OUT INJ PAST YR: ICD-10-PCS | Mod: CPTII,S$GLB,, | Performed by: FAMILY MEDICINE

## 2022-09-19 PROCEDURE — 3008F PR BODY MASS INDEX (BMI) DOCUMENTED: ICD-10-PCS | Mod: CPTII,S$GLB,, | Performed by: FAMILY MEDICINE

## 2022-09-19 PROCEDURE — 1160F RVW MEDS BY RX/DR IN RCRD: CPT | Mod: CPTII,S$GLB,, | Performed by: FAMILY MEDICINE

## 2022-09-19 PROCEDURE — 99215 PR OFFICE/OUTPT VISIT, EST, LEVL V, 40-54 MIN: ICD-10-PCS | Mod: S$GLB,,, | Performed by: FAMILY MEDICINE

## 2022-09-19 PROCEDURE — 1126F PR PAIN SEVERITY QUANTIFIED, NO PAIN PRESENT: ICD-10-PCS | Mod: CPTII,S$GLB,, | Performed by: FAMILY MEDICINE

## 2022-09-19 PROCEDURE — 3079F PR MOST RECENT DIASTOLIC BLOOD PRESSURE 80-89 MM HG: ICD-10-PCS | Mod: CPTII,S$GLB,, | Performed by: FAMILY MEDICINE

## 2022-09-19 PROCEDURE — 3074F PR MOST RECENT SYSTOLIC BLOOD PRESSURE < 130 MM HG: ICD-10-PCS | Mod: CPTII,S$GLB,, | Performed by: FAMILY MEDICINE

## 2022-09-19 PROCEDURE — 1101F PT FALLS ASSESS-DOCD LE1/YR: CPT | Mod: CPTII,S$GLB,, | Performed by: FAMILY MEDICINE

## 2022-09-19 PROCEDURE — 99499 UNLISTED E&M SERVICE: CPT | Mod: HCNC,S$GLB,, | Performed by: FAMILY MEDICINE

## 2022-09-19 PROCEDURE — 99999 PR PBB SHADOW E&M-EST. PATIENT-LVL III: CPT | Mod: PBBFAC,,, | Performed by: FAMILY MEDICINE

## 2022-09-19 PROCEDURE — 1160F PR REVIEW ALL MEDS BY PRESCRIBER/CLIN PHARMACIST DOCUMENTED: ICD-10-PCS | Mod: CPTII,S$GLB,, | Performed by: FAMILY MEDICINE

## 2022-09-19 PROCEDURE — 1126F AMNT PAIN NOTED NONE PRSNT: CPT | Mod: CPTII,S$GLB,, | Performed by: FAMILY MEDICINE

## 2022-09-19 PROCEDURE — 99999 PR PBB SHADOW E&M-EST. PATIENT-LVL III: ICD-10-PCS | Mod: PBBFAC,,, | Performed by: FAMILY MEDICINE

## 2022-09-19 PROCEDURE — 1159F MED LIST DOCD IN RCRD: CPT | Mod: CPTII,S$GLB,, | Performed by: FAMILY MEDICINE

## 2022-09-19 PROCEDURE — 99215 OFFICE O/P EST HI 40 MIN: CPT | Mod: S$GLB,,, | Performed by: FAMILY MEDICINE

## 2022-09-19 PROCEDURE — 99499 RISK ADDL DX/OHS AUDIT: ICD-10-PCS | Mod: HCNC,S$GLB,, | Performed by: FAMILY MEDICINE

## 2022-09-19 PROCEDURE — 1159F PR MEDICATION LIST DOCUMENTED IN MEDICAL RECORD: ICD-10-PCS | Mod: CPTII,S$GLB,, | Performed by: FAMILY MEDICINE

## 2022-09-19 RX ORDER — ESCITALOPRAM OXALATE 10 MG/1
10 TABLET ORAL DAILY
Qty: 90 TABLET | Refills: 2 | Status: SHIPPED | OUTPATIENT
Start: 2022-09-19 | End: 2022-12-19

## 2022-09-19 NOTE — PROGRESS NOTES
Assessment & Plan  Problem List Items Addressed This Visit    None  Visit Diagnoses       Mood disorder        Relevant Medications    EScitalopram oxalate (LEXAPRO) 10 MG tablet              Health Maintenance reviewed.    Follow-up: No follow-ups on file.    ______________________________________________________________________    Chief Complaint  No chief complaint on file.      HPI  Mounika Barnett is a 71 y.o. female with multiple medical diagnoses as listed in the medical history and problem list that presents for depressed mood.  Pt is known to me with last appointment 2021.    Patient denies any new symptoms including chest pain, SOB, blurry vision, N/V, diarrhea.  She reports increased stress at home.  Her 's condition continues to worsen.  He continues to have worsening dementia.  She is the primary caregiver.  Patient reports previous use of Lexapro did assist with her symptoms.  She would like to possibly change the medication or increase the dosage.      PAST MEDICAL HISTORY:  Past Medical History:   Diagnosis Date    Osteoarthritis of both hands 5/3/2018       PAST SURGICAL HISTORY:  Past Surgical History:   Procedure Laterality Date     SECTION      DILATION AND CURETTAGE OF UTERUS      TONSILLECTOMY         SOCIAL HISTORY:  Social History     Socioeconomic History    Marital status:    Tobacco Use    Smoking status: Never    Smokeless tobacco: Never   Substance and Sexual Activity    Alcohol use: No     Alcohol/week: 0.0 standard drinks    Drug use: No    Sexual activity: Never     Partners: Male     Social Determinants of Health     Financial Resource Strain: Low Risk     Difficulty of Paying Living Expenses: Not hard at all   Food Insecurity: No Food Insecurity    Worried About Running Out of Food in the Last Year: Never true    Ran Out of Food in the Last Year: Never true   Transportation Needs: No Transportation Needs    Lack of Transportation (Medical): No     Lack of Transportation (Non-Medical): No   Physical Activity: Sufficiently Active    Days of Exercise per Week: 7 days    Minutes of Exercise per Session: 150+ min   Stress: Stress Concern Present    Feeling of Stress : To some extent   Social Connections: Unknown    Frequency of Communication with Friends and Family: Once a week    Frequency of Social Gatherings with Friends and Family: Patient refused    Active Member of Clubs or Organizations: Patient refused    Attends Club or Organization Meetings: Patient refused    Marital Status:    Housing Stability: Low Risk     Unable to Pay for Housing in the Last Year: No    Number of Places Lived in the Last Year: 1    Unstable Housing in the Last Year: No       FAMILY HISTORY:  Family History   Problem Relation Age of Onset    Hypertension Mother     Heart disease Mother     Cancer Father         liver CA    Hypertension Brother        ALLERGIES AND MEDICATIONS: updated and reviewed.  Review of patient's allergies indicates:  No Known Allergies  Current Outpatient Medications   Medication Sig Dispense Refill    calcium citrate/vitamin D2 (CALCIUM CITRATE WITH D ORAL) Take 1 tablet by mouth 2 (two) times daily. 600 mg+D3 20 mcg=800 IU      EScitalopram oxalate (LEXAPRO) 10 MG tablet Take 1 tablet (10 mg total) by mouth once daily. 90 tablet 2    FLUZONE HIGHDOSE QUAD 21-22  mcg/0.7 mL Syrg        No current facility-administered medications for this visit.         ROS  Review of Systems   Constitutional:  Negative for activity change, appetite change, fatigue, fever and unexpected weight change.   HENT: Negative.  Negative for ear discharge, ear pain, rhinorrhea and sore throat.    Eyes: Negative.    Respiratory:  Negative for apnea, cough, chest tightness, shortness of breath and wheezing.    Cardiovascular:  Negative for chest pain, palpitations and leg swelling.   Gastrointestinal:  Negative for abdominal distention, abdominal pain, constipation,  "diarrhea and vomiting.   Endocrine: Negative for cold intolerance, heat intolerance, polydipsia and polyuria.   Genitourinary:  Negative for decreased urine volume and urgency.   Musculoskeletal: Negative.    Skin:  Negative for rash.   Neurological:  Negative for dizziness and headaches.   Hematological:  Does not bruise/bleed easily.   Psychiatric/Behavioral:  Positive for decreased concentration and dysphoric mood. Negative for agitation, sleep disturbance and suicidal ideas.          Physical Exam  Vitals:    09/19/22 0852   BP: 118/80   Pulse: 69   Temp: 97.9 °F (36.6 °C)   TempSrc: Oral   SpO2: 99%   Weight: 39.2 kg (86 lb 6.7 oz)   Height: 5' 2" (1.575 m)    Body mass index is 15.81 kg/m².  Weight: 39.2 kg (86 lb 6.7 oz)   Height: 5' 2" (157.5 cm)   Physical Exam  Vitals reviewed.   Constitutional:       Appearance: Normal appearance. She is well-developed.   HENT:      Head: Normocephalic and atraumatic.      Right Ear: External ear normal.      Left Ear: External ear normal.      Nose: Nose normal.      Mouth/Throat:      Mouth: Mucous membranes are moist.      Pharynx: Oropharynx is clear.   Eyes:      Extraocular Movements: Extraocular movements intact.      Conjunctiva/sclera: Conjunctivae normal.      Pupils: Pupils are equal, round, and reactive to light.   Cardiovascular:      Comments: Heart beating spontaneously   Neurological:      Mental Status: She is alert and oriented to person, place, and time.   Psychiatric:         Mood and Affect: Mood is depressed.         Health Maintenance         Date Due Completion Date    Influenza Vaccine (1) 09/01/2022 10/18/2021    Mammogram 12/14/2022 12/14/2021    Colorectal Cancer Screening 05/11/2023 5/11/2020    DEXA Scan 08/12/2023 8/12/2020    Lipid Panel 12/13/2026 12/13/2021    TETANUS VACCINE 05/03/2028 5/3/2018 (Declined)    Override on 5/3/2018: Declined                Patient note was created using [employer].  Any errors in syntax or even information may " not have been identified and edited on initial review prior to signing this note.

## 2022-10-14 ENCOUNTER — TELEPHONE (OUTPATIENT)
Dept: FAMILY MEDICINE | Facility: CLINIC | Age: 72
End: 2022-10-14
Payer: MEDICARE

## 2022-12-19 ENCOUNTER — HOSPITAL ENCOUNTER (OUTPATIENT)
Dept: RADIOLOGY | Facility: HOSPITAL | Age: 72
Discharge: HOME | End: 2022-12-19
Attending: FAMILY MEDICINE
Payer: MEDICARE

## 2022-12-19 ENCOUNTER — OFFICE VISIT (OUTPATIENT)
Dept: FAMILY MEDICINE | Facility: CLINIC | Age: 72
End: 2022-12-19
Payer: MEDICARE

## 2022-12-19 VITALS
HEIGHT: 62 IN | WEIGHT: 86.19 LBS | BODY MASS INDEX: 15.86 KG/M2 | OXYGEN SATURATION: 95 % | HEART RATE: 68 BPM | DIASTOLIC BLOOD PRESSURE: 72 MMHG | TEMPERATURE: 98 F | SYSTOLIC BLOOD PRESSURE: 132 MMHG

## 2022-12-19 DIAGNOSIS — Z00.00 ANNUAL PHYSICAL EXAM: Primary | ICD-10-CM

## 2022-12-19 DIAGNOSIS — F39 MOOD DISORDER: ICD-10-CM

## 2022-12-19 DIAGNOSIS — Z12.31 SCREENING MAMMOGRAM FOR BREAST CANCER: ICD-10-CM

## 2022-12-19 DIAGNOSIS — E44.1 MILD PROTEIN-CALORIE MALNUTRITION: ICD-10-CM

## 2022-12-19 PROCEDURE — 77063 MAMMO DIGITAL SCREENING BILAT WITH TOMO: ICD-10-PCS | Mod: 26,HCNC,, | Performed by: RADIOLOGY

## 2022-12-19 PROCEDURE — 99397 PER PM REEVAL EST PAT 65+ YR: CPT | Mod: HCNC,S$GLB,, | Performed by: FAMILY MEDICINE

## 2022-12-19 PROCEDURE — 1101F PT FALLS ASSESS-DOCD LE1/YR: CPT | Mod: HCNC,CPTII,S$GLB, | Performed by: FAMILY MEDICINE

## 2022-12-19 PROCEDURE — 3008F BODY MASS INDEX DOCD: CPT | Mod: HCNC,CPTII,S$GLB, | Performed by: FAMILY MEDICINE

## 2022-12-19 PROCEDURE — 77067 SCR MAMMO BI INCL CAD: CPT | Mod: 26,HCNC,, | Performed by: RADIOLOGY

## 2022-12-19 PROCEDURE — 1159F MED LIST DOCD IN RCRD: CPT | Mod: HCNC,CPTII,S$GLB, | Performed by: FAMILY MEDICINE

## 2022-12-19 PROCEDURE — 3075F PR MOST RECENT SYSTOLIC BLOOD PRESS GE 130-139MM HG: ICD-10-PCS | Mod: HCNC,CPTII,S$GLB, | Performed by: FAMILY MEDICINE

## 2022-12-19 PROCEDURE — 99499 UNLISTED E&M SERVICE: CPT | Mod: S$GLB,,, | Performed by: FAMILY MEDICINE

## 2022-12-19 PROCEDURE — 3288F FALL RISK ASSESSMENT DOCD: CPT | Mod: HCNC,CPTII,S$GLB, | Performed by: FAMILY MEDICINE

## 2022-12-19 PROCEDURE — 77067 SCR MAMMO BI INCL CAD: CPT | Mod: TC,HCNC,PO

## 2022-12-19 PROCEDURE — 99999 PR PBB SHADOW E&M-EST. PATIENT-LVL III: CPT | Mod: PBBFAC,HCNC,, | Performed by: FAMILY MEDICINE

## 2022-12-19 PROCEDURE — 1160F RVW MEDS BY RX/DR IN RCRD: CPT | Mod: HCNC,CPTII,S$GLB, | Performed by: FAMILY MEDICINE

## 2022-12-19 PROCEDURE — 99397 PR PREVENTIVE VISIT,EST,65 & OVER: ICD-10-PCS | Mod: HCNC,S$GLB,, | Performed by: FAMILY MEDICINE

## 2022-12-19 PROCEDURE — 3075F SYST BP GE 130 - 139MM HG: CPT | Mod: HCNC,CPTII,S$GLB, | Performed by: FAMILY MEDICINE

## 2022-12-19 PROCEDURE — 1126F PR PAIN SEVERITY QUANTIFIED, NO PAIN PRESENT: ICD-10-PCS | Mod: HCNC,CPTII,S$GLB, | Performed by: FAMILY MEDICINE

## 2022-12-19 PROCEDURE — 77063 BREAST TOMOSYNTHESIS BI: CPT | Mod: 26,HCNC,, | Performed by: RADIOLOGY

## 2022-12-19 PROCEDURE — 1160F PR REVIEW ALL MEDS BY PRESCRIBER/CLIN PHARMACIST DOCUMENTED: ICD-10-PCS | Mod: HCNC,CPTII,S$GLB, | Performed by: FAMILY MEDICINE

## 2022-12-19 PROCEDURE — 1101F PR PT FALLS ASSESS DOC 0-1 FALLS W/OUT INJ PAST YR: ICD-10-PCS | Mod: HCNC,CPTII,S$GLB, | Performed by: FAMILY MEDICINE

## 2022-12-19 PROCEDURE — 1126F AMNT PAIN NOTED NONE PRSNT: CPT | Mod: HCNC,CPTII,S$GLB, | Performed by: FAMILY MEDICINE

## 2022-12-19 PROCEDURE — 99499 RISK ADDL DX/OHS AUDIT: ICD-10-PCS | Mod: S$GLB,,, | Performed by: FAMILY MEDICINE

## 2022-12-19 PROCEDURE — 77063 BREAST TOMOSYNTHESIS BI: CPT | Mod: TC,HCNC,PO

## 2022-12-19 PROCEDURE — 99999 PR PBB SHADOW E&M-EST. PATIENT-LVL III: ICD-10-PCS | Mod: PBBFAC,HCNC,, | Performed by: FAMILY MEDICINE

## 2022-12-19 PROCEDURE — 3008F PR BODY MASS INDEX (BMI) DOCUMENTED: ICD-10-PCS | Mod: HCNC,CPTII,S$GLB, | Performed by: FAMILY MEDICINE

## 2022-12-19 PROCEDURE — 77067 MAMMO DIGITAL SCREENING BILAT WITH TOMO: ICD-10-PCS | Mod: 26,HCNC,, | Performed by: RADIOLOGY

## 2022-12-19 PROCEDURE — 1159F PR MEDICATION LIST DOCUMENTED IN MEDICAL RECORD: ICD-10-PCS | Mod: HCNC,CPTII,S$GLB, | Performed by: FAMILY MEDICINE

## 2022-12-19 PROCEDURE — 3288F PR FALLS RISK ASSESSMENT DOCUMENTED: ICD-10-PCS | Mod: HCNC,CPTII,S$GLB, | Performed by: FAMILY MEDICINE

## 2022-12-19 PROCEDURE — 3078F DIAST BP <80 MM HG: CPT | Mod: HCNC,CPTII,S$GLB, | Performed by: FAMILY MEDICINE

## 2022-12-19 PROCEDURE — 3078F PR MOST RECENT DIASTOLIC BLOOD PRESSURE < 80 MM HG: ICD-10-PCS | Mod: HCNC,CPTII,S$GLB, | Performed by: FAMILY MEDICINE

## 2022-12-19 RX ORDER — CITALOPRAM 20 MG/1
20 TABLET, FILM COATED ORAL DAILY
Qty: 90 TABLET | Refills: 3 | Status: SHIPPED | OUTPATIENT
Start: 2022-12-19 | End: 2024-02-07

## 2022-12-19 NOTE — PROGRESS NOTES
Assessment & Plan  Problem List Items Addressed This Visit          Endocrine    Mild protein-calorie malnutrition    Relevant Orders    CBC Auto Differential    Comprehensive Metabolic Panel    Lipid Panel    TSH    Body mass index (BMI) less than 16.5     Other Visit Diagnoses       Annual physical exam    -  Primary    Relevant Orders    CBC Auto Differential    Comprehensive Metabolic Panel    Lipid Panel    TSH    Screening mammogram for breast cancer        Relevant Orders    Mammo Digital Screening Bilat    Mood disorder        Relevant Medications    citalopram (CELEXA) 20 MG tablet        I addressed all major concerns as it related to health maintenance.  All were ordered and scheduled based on the patients wishes.  Any additional health maintenance will be readdressed at the next physical if declined or deferred by the patient.        Health Maintenance reviewed.    Follow-up: No follow-ups on file.    ______________________________________________________________________    Chief Complaint  Chief Complaint   Patient presents with    Annual Exam       HPI  Mounika Barnett is a 72 y.o. female with multiple medical diagnoses as listed in the medical history and problem list that presents for annual exam.  Pt is known to me with last appointment 2022.    Patient denies any new symptoms including chest pain, SOB, blurry vision, N/V, diarrhea.        PAST MEDICAL HISTORY:  Past Medical History:   Diagnosis Date    Osteoarthritis of both hands 5/3/2018       PAST SURGICAL HISTORY:  Past Surgical History:   Procedure Laterality Date     SECTION      DILATION AND CURETTAGE OF UTERUS      TONSILLECTOMY         SOCIAL HISTORY:  Social History     Socioeconomic History    Marital status:    Tobacco Use    Smoking status: Never    Smokeless tobacco: Never   Substance and Sexual Activity    Alcohol use: No     Alcohol/week: 0.0 standard drinks    Drug use: No    Sexual activity: Never      Partners: Male     Social Determinants of Health     Financial Resource Strain: Low Risk     Difficulty of Paying Living Expenses: Not hard at all   Food Insecurity: No Food Insecurity    Worried About Running Out of Food in the Last Year: Never true    Ran Out of Food in the Last Year: Never true   Transportation Needs: No Transportation Needs    Lack of Transportation (Medical): No    Lack of Transportation (Non-Medical): No   Physical Activity: Sufficiently Active    Days of Exercise per Week: 7 days    Minutes of Exercise per Session: 150+ min   Stress: Stress Concern Present    Feeling of Stress : To some extent   Social Connections: Unknown    Frequency of Communication with Friends and Family: Three times a week    Frequency of Social Gatherings with Friends and Family: Patient refused    Active Member of Clubs or Organizations: Patient refused    Attends Club or Organization Meetings: Patient refused    Marital Status:    Housing Stability: Low Risk     Unable to Pay for Housing in the Last Year: No    Number of Places Lived in the Last Year: 1    Unstable Housing in the Last Year: No       FAMILY HISTORY:  Family History   Problem Relation Age of Onset    Hypertension Mother     Heart disease Mother     Cancer Father         liver CA    Hypertension Brother        ALLERGIES AND MEDICATIONS: updated and reviewed.  Review of patient's allergies indicates:  No Known Allergies  Current Outpatient Medications   Medication Sig Dispense Refill    calcium citrate/vitamin D2 (CALCIUM CITRATE WITH D ORAL) Take 1 tablet by mouth 2 (two) times daily. 600 mg+D3 20 mcg=800 IU      citalopram (CELEXA) 20 MG tablet Take 1 tablet (20 mg total) by mouth once daily. 90 tablet 3    FLUZONE HIGHDOSE QUAD 21-22  mcg/0.7 mL Syrg        No current facility-administered medications for this visit.         ROS  Review of Systems   Constitutional:  Negative for activity change, appetite change, fatigue, fever and  "unexpected weight change.   HENT: Negative.  Negative for ear discharge, ear pain, rhinorrhea and sore throat.    Eyes: Negative.    Respiratory:  Negative for apnea, cough, chest tightness, shortness of breath and wheezing.    Cardiovascular:  Negative for chest pain, palpitations and leg swelling.   Gastrointestinal:  Negative for abdominal distention, abdominal pain, constipation, diarrhea and vomiting.   Endocrine: Negative for cold intolerance, heat intolerance, polydipsia and polyuria.   Genitourinary:  Negative for decreased urine volume and urgency.   Musculoskeletal: Negative.    Skin:  Negative for rash.   Neurological:  Negative for dizziness and headaches.   Hematological:  Does not bruise/bleed easily.   Psychiatric/Behavioral:  Negative for agitation, sleep disturbance and suicidal ideas.          Physical Exam  Vitals:    12/19/22 0832   BP: 132/72   Pulse: 68   Temp: 97.8 °F (36.6 °C)   TempSrc: Oral   SpO2: 95%   Weight: 39.1 kg (86 lb 3.2 oz)   Height: 5' 2" (1.575 m)    Body mass index is 15.77 kg/m².  Weight: 39.1 kg (86 lb 3.2 oz)   Height: 5' 2" (157.5 cm)   Physical Exam  Vitals reviewed.   Constitutional:       Appearance: Normal appearance. She is well-developed.   HENT:      Head: Normocephalic and atraumatic.      Right Ear: External ear normal.      Left Ear: External ear normal.      Nose: Nose normal.      Mouth/Throat:      Mouth: Mucous membranes are moist.      Pharynx: Oropharynx is clear.   Eyes:      Extraocular Movements: Extraocular movements intact.      Conjunctiva/sclera: Conjunctivae normal.      Pupils: Pupils are equal, round, and reactive to light.   Cardiovascular:      Rate and Rhythm: Normal rate and regular rhythm.      Heart sounds: Normal heart sounds.   Pulmonary:      Effort: Pulmonary effort is normal.      Breath sounds: Normal breath sounds.   Skin:     General: Skin is warm and dry.   Neurological:      Mental Status: She is alert and oriented to person, " place, and time.         Health Maintenance         Date Due Completion Date    Mammogram 12/14/2022 12/14/2021    Colorectal Cancer Screening 05/11/2023 5/11/2020    DEXA Scan 08/12/2023 8/12/2020    Lipid Panel 12/13/2026 12/13/2021    TETANUS VACCINE 05/03/2028 5/3/2018 (Declined)    Override on 5/3/2018: Declined                Patient note was created using [employer].  Any errors in syntax or even information may not have been identified and edited on initial review prior to signing this note.

## 2023-01-12 ENCOUNTER — PES CALL (OUTPATIENT)
Dept: ADMINISTRATIVE | Facility: CLINIC | Age: 73
End: 2023-01-12
Payer: MEDICARE

## 2023-03-01 ENCOUNTER — PATIENT OUTREACH (OUTPATIENT)
Dept: ADMINISTRATIVE | Facility: HOSPITAL | Age: 73
End: 2023-03-01
Payer: MEDICARE

## 2023-03-01 DIAGNOSIS — Z12.11 SPECIAL SCREENING FOR MALIGNANT NEOPLASM OF COLON: Primary | ICD-10-CM

## 2023-03-01 RX ORDER — BNT162B2 ORIGINAL AND OMICRON BA.4/BA.5 .1125; .1125 MG/2.25ML; MG/2.25ML
INJECTION, SUSPENSION INTRAMUSCULAR
COMMUNITY
Start: 2022-10-05

## 2023-03-01 RX ORDER — INFLUENZA A VIRUS A/VICTORIA/2570/2019 IVR-215 (H1N1) ANTIGEN (FORMALDEHYDE INACTIVATED), INFLUENZA A VIRUS A/DARWIN/6/2021 IVR-227 (H3N2) ANTIGEN (FORMALDEHYDE INACTIVATED), INFLUENZA B VIRUS B/AUSTRIA/1359417/2021 BVR-26 ANTIGEN (FORMALDEHYDE INACTIVATED), INFLUENZA B VIRUS B/PHUKET/3073/2013 BVR-1B ANTIGEN (FORMALDEHYDE INACTIVATED) 15; 15; 15; 15 UG/.5ML; UG/.5ML; UG/.5ML; UG/.5ML
INJECTION, SUSPENSION INTRAMUSCULAR
COMMUNITY
Start: 2022-10-05

## 2023-03-01 NOTE — PROGRESS NOTES
Cologuard Screening due soon - the patient was notified that her screening is due in 05/2023. Cologuard ordered.

## 2023-05-25 LAB — NONINV COLON CA DNA+OCC BLD SCRN STL QL: NEGATIVE

## 2023-10-04 DIAGNOSIS — Z78.0 MENOPAUSE: ICD-10-CM

## 2024-02-06 ENCOUNTER — HOSPITAL ENCOUNTER (OUTPATIENT)
Dept: RADIOLOGY | Facility: HOSPITAL | Age: 74
Discharge: HOME | End: 2024-02-06
Attending: FAMILY MEDICINE
Payer: MEDICARE

## 2024-02-06 ENCOUNTER — HOSPITAL ENCOUNTER (OUTPATIENT)
Dept: RADIOLOGY | Facility: CLINIC | Age: 74
Discharge: HOME | End: 2024-02-06
Attending: FAMILY MEDICINE
Payer: MEDICARE

## 2024-02-06 DIAGNOSIS — Z12.31 ENCOUNTER FOR SCREENING MAMMOGRAM FOR BREAST CANCER: ICD-10-CM

## 2024-02-06 DIAGNOSIS — Z78.0 MENOPAUSE: ICD-10-CM

## 2024-02-06 PROCEDURE — 77063 BREAST TOMOSYNTHESIS BI: CPT | Mod: 26,,, | Performed by: RADIOLOGY

## 2024-02-06 PROCEDURE — 77067 SCR MAMMO BI INCL CAD: CPT | Mod: 26,,, | Performed by: RADIOLOGY

## 2024-02-06 PROCEDURE — 77080 DXA BONE DENSITY AXIAL: CPT | Mod: 26,,, | Performed by: INTERNAL MEDICINE

## 2024-02-06 PROCEDURE — 77080 DXA BONE DENSITY AXIAL: CPT | Mod: TC,PO

## 2024-02-06 PROCEDURE — 77067 SCR MAMMO BI INCL CAD: CPT | Mod: TC,PO

## 2024-02-07 DIAGNOSIS — F39 MOOD DISORDER: ICD-10-CM

## 2024-02-07 RX ORDER — CITALOPRAM 20 MG/1
20 TABLET, FILM COATED ORAL
Qty: 90 TABLET | Refills: 0 | Status: SHIPPED | OUTPATIENT
Start: 2024-02-07 | End: 2024-05-14

## 2024-02-07 NOTE — TELEPHONE ENCOUNTER
Care Due:                  Date            Visit Type   Department     Provider  --------------------------------------------------------------------------------                                EP -         [facility] FAM[facility]                              PRIMARY      MED/ INTERNAL  Last Visit: 12-      CARE ([facility])   MED/ PEDS      Helga Harmon                              [facility]                              ANNUAL       [facility]                              CHECKUP/PHY  MED/ INTERNAL  Next Visit: 05-      S            MED/ PEDS      Helga Harmon                                                            Last  Test          Frequency    Reason                     Performed    Due Date  --------------------------------------------------------------------------------    Office Visit  15 months..  citalopram...............  12- 03-    [facility] Embedded Care Due Messages. Reference number: [id number].   2/07/2024 8:45:46 AM CST

## 2024-02-07 NOTE — TELEPHONE ENCOUNTER
Refill Routing Note   Medication(s) are not appropriate for processing by [facility] for the following reason(s):        No active prescription written by provider    [facility] action(s):  Defer   Requires appointment : Yes        Medication Therapy Plan:  AS OF  23      Appointments  past 12m or future 3m with PCP    Date Provider   Last Visit   2022 Helga Crain MD   Next Visit   2024 Helga Crain MD   ED visits in past 90 days: 0        Note composed:10:23 AM 2024

## 2024-05-14 ENCOUNTER — PATIENT MESSAGE (OUTPATIENT)
Dept: ADMINISTRATIVE | Facility: HOSPITAL | Age: 74
End: 2024-05-14
Payer: MEDICARE

## 2024-05-14 ENCOUNTER — OFFICE VISIT (OUTPATIENT)
Dept: FAMILY MEDICINE | Facility: CLINIC | Age: 74
End: 2024-05-14
Payer: MEDICARE

## 2024-05-14 ENCOUNTER — LAB VISIT (OUTPATIENT)
Dept: LAB | Facility: HOSPITAL | Age: 74
End: 2024-05-14
Attending: FAMILY MEDICINE
Payer: MEDICARE

## 2024-05-14 VITALS
DIASTOLIC BLOOD PRESSURE: 70 MMHG | BODY MASS INDEX: 15.54 KG/M2 | TEMPERATURE: 97 F | HEIGHT: 62 IN | SYSTOLIC BLOOD PRESSURE: 122 MMHG | OXYGEN SATURATION: 96 % | WEIGHT: 84.44 LBS | HEART RATE: 68 BPM

## 2024-05-14 DIAGNOSIS — L84 CORN: ICD-10-CM

## 2024-05-14 DIAGNOSIS — Z00.00 ANNUAL PHYSICAL EXAM: ICD-10-CM

## 2024-05-14 DIAGNOSIS — E44.1 MILD PROTEIN-CALORIE MALNUTRITION: ICD-10-CM

## 2024-05-14 DIAGNOSIS — Z00.00 ANNUAL PHYSICAL EXAM: Primary | ICD-10-CM

## 2024-05-14 DIAGNOSIS — F39 MOOD DISORDER: ICD-10-CM

## 2024-05-14 LAB
ALBUMIN SERPL BCP-MCNC: 4.2 G/DL (ref 3.5–5.2)
ALP SERPL-CCNC: 104 U/L (ref 55–135)
ALT SERPL W/O P-5'-P-CCNC: 27 U/L (ref 10–44)
ANION GAP SERPL CALC-SCNC: 11 MMOL/L (ref 8–16)
AST SERPL-CCNC: 28 U/L (ref 10–40)
BASOPHILS # BLD AUTO: 0.03 K/UL (ref 0–0.2)
BASOPHILS NFR BLD: 0.5 % (ref 0–1.9)
BILIRUB SERPL-MCNC: 0.6 MG/DL (ref 0.1–1)
BUN SERPL-MCNC: 15 MG/DL (ref 8–23)
CALCIUM SERPL-MCNC: 10.4 MG/DL (ref 8.7–10.5)
CHLORIDE SERPL-SCNC: 103 MMOL/L (ref 95–110)
CHOLEST SERPL-MCNC: 215 MG/DL (ref 120–199)
CHOLEST/HDLC SERPL: 2.4 {RATIO} (ref 2–5)
CO2 SERPL-SCNC: 25 MMOL/L (ref 23–29)
CREAT SERPL-MCNC: 0.7 MG/DL (ref 0.5–1.4)
DIFFERENTIAL METHOD BLD: ABNORMAL
EOSINOPHIL # BLD AUTO: 0.1 K/UL (ref 0–0.5)
EOSINOPHIL NFR BLD: 0.8 % (ref 0–8)
ERYTHROCYTE [DISTWIDTH] IN BLOOD BY AUTOMATED COUNT: 12.5 % (ref 11.5–14.5)
EST. GFR  (NO RACE VARIABLE): >60 ML/MIN/1.73 M^2
GLUCOSE SERPL-MCNC: 93 MG/DL (ref 70–110)
HCT VFR BLD AUTO: 47.4 % (ref 37–48.5)
HDLC SERPL-MCNC: 88 MG/DL (ref 40–75)
HDLC SERPL: 40.9 % (ref 20–50)
HGB BLD-MCNC: 16 G/DL (ref 12–16)
IMM GRANULOCYTES # BLD AUTO: 0.02 K/UL (ref 0–0.04)
IMM GRANULOCYTES NFR BLD AUTO: 0.3 % (ref 0–0.5)
LDLC SERPL CALC-MCNC: 110 MG/DL (ref 63–159)
LYMPHOCYTES # BLD AUTO: 0.5 K/UL (ref 1–4.8)
LYMPHOCYTES NFR BLD: 8.7 % (ref 18–48)
MCH RBC QN AUTO: 32.7 PG (ref 27–31)
MCHC RBC AUTO-ENTMCNC: 33.8 G/DL (ref 32–36)
MCV RBC AUTO: 97 FL (ref 82–98)
MONOCYTES # BLD AUTO: 0.4 K/UL (ref 0.3–1)
MONOCYTES NFR BLD: 6.7 % (ref 4–15)
NEUTROPHILS # BLD AUTO: 5.1 K/UL (ref 1.8–7.7)
NEUTROPHILS NFR BLD: 83 % (ref 38–73)
NONHDLC SERPL-MCNC: 127 MG/DL
NRBC BLD-RTO: 0 /100 WBC
PLATELET # BLD AUTO: 311 K/UL (ref 150–450)
PMV BLD AUTO: 10.5 FL (ref 9.2–12.9)
POTASSIUM SERPL-SCNC: 4.4 MMOL/L (ref 3.5–5.1)
PROT SERPL-MCNC: 8 G/DL (ref 6–8.4)
RBC # BLD AUTO: 4.9 M/UL (ref 4–5.4)
SODIUM SERPL-SCNC: 139 MMOL/L (ref 136–145)
TRIGL SERPL-MCNC: 85 MG/DL (ref 30–150)
TSH SERPL DL<=0.005 MIU/L-ACNC: 1.33 UIU/ML (ref 0.4–4)
WBC # BLD AUTO: 6.1 K/UL (ref 3.9–12.7)

## 2024-05-14 PROCEDURE — 1101F PT FALLS ASSESS-DOCD LE1/YR: CPT | Mod: CPTII,S$GLB,, | Performed by: FAMILY MEDICINE

## 2024-05-14 PROCEDURE — 3078F DIAST BP <80 MM HG: CPT | Mod: CPTII,S$GLB,, | Performed by: FAMILY MEDICINE

## 2024-05-14 PROCEDURE — 3074F SYST BP LT 130 MM HG: CPT | Mod: CPTII,S$GLB,, | Performed by: FAMILY MEDICINE

## 2024-05-14 PROCEDURE — 99999 PR PBB SHADOW E&M-EST. PATIENT-LVL III: CPT | Mod: PBBFAC,,, | Performed by: FAMILY MEDICINE

## 2024-05-14 PROCEDURE — 1160F RVW MEDS BY RX/DR IN RCRD: CPT | Mod: CPTII,S$GLB,, | Performed by: FAMILY MEDICINE

## 2024-05-14 PROCEDURE — 80061 LIPID PANEL: CPT | Performed by: FAMILY MEDICINE

## 2024-05-14 PROCEDURE — 99397 PER PM REEVAL EST PAT 65+ YR: CPT | Mod: S$GLB,,, | Performed by: FAMILY MEDICINE

## 2024-05-14 PROCEDURE — 3288F FALL RISK ASSESSMENT DOCD: CPT | Mod: CPTII,S$GLB,, | Performed by: FAMILY MEDICINE

## 2024-05-14 PROCEDURE — 80053 COMPREHEN METABOLIC PANEL: CPT | Performed by: FAMILY MEDICINE

## 2024-05-14 PROCEDURE — 3008F BODY MASS INDEX DOCD: CPT | Mod: CPTII,S$GLB,, | Performed by: FAMILY MEDICINE

## 2024-05-14 PROCEDURE — 1159F MED LIST DOCD IN RCRD: CPT | Mod: CPTII,S$GLB,, | Performed by: FAMILY MEDICINE

## 2024-05-14 PROCEDURE — 36415 COLL VENOUS BLD VENIPUNCTURE: CPT | Mod: PO | Performed by: FAMILY MEDICINE

## 2024-05-14 PROCEDURE — 1126F AMNT PAIN NOTED NONE PRSNT: CPT | Mod: CPTII,S$GLB,, | Performed by: FAMILY MEDICINE

## 2024-05-14 PROCEDURE — 84443 ASSAY THYROID STIM HORMONE: CPT | Performed by: FAMILY MEDICINE

## 2024-05-14 PROCEDURE — 85025 COMPLETE CBC W/AUTO DIFF WBC: CPT | Performed by: FAMILY MEDICINE

## 2024-05-14 RX ORDER — VENLAFAXINE HYDROCHLORIDE 37.5 MG/1
37.5 CAPSULE, EXTENDED RELEASE ORAL DAILY
Qty: 30 CAPSULE | Refills: 11 | Status: SHIPPED | OUTPATIENT
Start: 2024-05-14 | End: 2025-05-14

## 2024-05-14 NOTE — PROGRESS NOTES
Assessment & Plan  Problem List Items Addressed This Visit          Endocrine    Mild protein-calorie malnutrition    Relevant Orders    Comprehensive Metabolic Panel (Completed)    CBC Auto Differential (Completed)    Lipid Panel (Completed)    TSH (Completed)     Other Visit Diagnoses       Annual physical exam    -  Primary    Relevant Orders    Comprehensive Metabolic Panel (Completed)    CBC Auto Differential (Completed)    Lipid Panel (Completed)    TSH (Completed)    Mood disorder        Relevant Medications    venlafaxine (EFFEXOR-XR) 37.5 MG 24 hr capsule    Corn        Relevant Orders    Ambulatory referral/consult to Podiatry             No medication for osteoporosis at this time.  Concerned or side effects and risk outweighs benefit.         Health Maintenance reviewed.      ______________________________________________________________________    Chief Complaint  Chief Complaint   Patient presents with    Annual Exam       HPI  Mounika Barnett is a 73 y.o. female with multiple medical diagnoses as listed in the medical history and problem list that presents for annual exam.  Pt is known to me with last appointment 2022.     Patient denies any new symptoms including chest pain, SOB, blurry vision, N/V, diarrhea.  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 It is a high stress situation.  She stopped taking lexapro.  She doesn't remember falling after she found herself on the floor.  She had an episode in which she fell.  She stopped citalopram in January of this year.  Energy is lower and she does feel depressed. She would like to get on a different medication.        PAST MEDICAL HISTORY:  Past Medical History:   Diagnosis Date    Osteoarthritis of both hands 5/3/2018       PAST SURGICAL HISTORY:  Past Surgical History:   Procedure Laterality Date     SECTION      DILATION AND CURETTAGE OF UTERUS      TONSILLECTOMY         SOCIAL HISTORY:  Social History     Socioeconomic  History    Marital status:    Tobacco Use    Smoking status: Never    Smokeless tobacco: Never   Substance and Sexual Activity    Alcohol use: No     Alcohol/week: 0.0 standard drinks of alcohol    Drug use: No    Sexual activity: Never     Partners: Male     Social Determinants of Health     Financial Resource Strain: Low Risk  (2/3/2024)    Overall Financial Resource Strain (CARDIA)     Difficulty of Paying Living Expenses: Not very hard   Food Insecurity: No Food Insecurity (2/3/2024)    Hunger Vital Sign     Worried About Running Out of Food in the Last Year: Never true     Ran Out of Food in the Last Year: Never true   Transportation Needs: No Transportation Needs (2/3/2024)    PRAPARE - Transportation     Lack of Transportation (Medical): No     Lack of Transportation (Non-Medical): No   Physical Activity: Sufficiently Active (2/3/2024)    Exercise Vital Sign     Days of Exercise per Week: 7 days     Minutes of Exercise per Session: 80 min   Stress: Stress Concern Present (2/3/2024)    [employer] - Occupational Stress Questionnaire     Feeling of Stress : To some extent   Housing Stability: Low Risk  (2/3/2024)    Housing Stability Vital Sign     Unable to Pay for Housing in the Last Year: No     Number of Places Lived in the Last Year: 1     Unstable Housing in the Last Year: No       FAMILY HISTORY:  Family History   Problem Relation Name Age of Onset    Hypertension Mother      Heart disease Mother      Cancer Father          liver [place]    Hypertension Brother Tree        ALLERGIES AND MEDICATIONS: updated and reviewed.  Review of patient's allergies indicates:  No Known Allergies  Current Outpatient Medications   Medication Sig Dispense Refill    calcium citrate/vitamin D2 (CALCIUM CITRATE WITH D ORAL) Take 1 tablet by mouth 2 (two) times daily. 600 mg+D3 20 mcg=800 IU      venlafaxine (EFFEXOR-XR) 37.5 MG 24 hr capsule Take 1 capsule (37.5 mg total) by mouth once daily.  "30 capsule 11     No current facility-administered medications for this visit.         ROS  Review of Systems   Constitutional:  Negative for activity change and unexpected weight change.   HENT:  Positive for hearing loss. Negative for trouble swallowing.    Eyes:  Positive for discharge and visual disturbance.   Respiratory:  Negative for chest tightness and wheezing.    Cardiovascular:  Negative for chest pain and palpitations.   Gastrointestinal:  Negative for blood in stool, constipation, diarrhea and vomiting.   Endocrine: Negative for polydipsia and polyuria.   Genitourinary:  Negative for difficulty urinating, dysuria, hematuria and menstrual problem.   Musculoskeletal:  Positive for arthralgias. Negative for joint swelling and neck pain.   Neurological:  Negative for weakness and headaches.   Psychiatric/Behavioral:  Positive for dysphoric mood. Negative for confusion.            Physical Exam  Vitals:    05/14/24 0827   BP: 122/70   Pulse: 68   Temp: 97.3 °F (36.3 °C)   TempSrc: Oral   SpO2: 96%   Weight: 38.3 kg (84 lb 7 oz)   Height: 5' 2" (1.575 m)    Body mass index is 15.44 kg/m².  Weight: 38.3 kg (84 lb 7 oz)   Height: 5' 2" (157.5 cm)   Physical Exam  Vitals reviewed.   Constitutional:       Appearance: Normal appearance. She is well-developed.   HENT:      Head: Normocephalic and atraumatic.      Right Ear: External ear normal.      Left Ear: External ear normal.      Nose: Nose normal.      Mouth/Throat:      Mouth: Mucous membranes are moist.      Pharynx: Oropharynx is clear.   Eyes:      Extraocular Movements: Extraocular movements intact.      Conjunctiva/sclera: Conjunctivae normal.      Pupils: Pupils are equal, round, and reactive to light.   Cardiovascular:      Rate and Rhythm: Normal rate and regular rhythm.      Heart sounds: Normal heart sounds.   Pulmonary:      Effort: Pulmonary effort is normal.      Breath sounds: Normal breath sounds.   Skin:     General: Skin is warm and dry. "   Neurological:      Mental Status: She is alert and oriented to person, place, and time.               Health Maintenance         Date Due Completion Date    TETANUS VACCINE Never done ---    RSV Vaccine (Age 60+ and Pregnant patients) (1 - 1-dose 60+ series) Never done ---    COVID-19 Vaccine (7 - 2023-24 season) 02/10/2024 10/10/2023    Mammogram 02/06/2025 2/6/2024    DEXA Scan 02/06/2026 2/6/2024    Colorectal Cancer Screening 05/17/2026 5/17/2023    Lipid Panel 05/14/2029 5/14/2024                Patient note was created using [employer].  Any errors in syntax or even information may not have been identified and edited on initial review prior to signing this note.

## 2024-05-15 ENCOUNTER — PATIENT OUTREACH (OUTPATIENT)
Dept: ADMINISTRATIVE | Facility: HOSPITAL | Age: 74
End: 2024-05-15
Payer: MEDICARE

## 2024-07-01 ENCOUNTER — OFFICE VISIT (OUTPATIENT)
Dept: FAMILY MEDICINE | Facility: CLINIC | Age: 74
End: 2024-07-01
Payer: MEDICARE

## 2024-07-01 VITALS
SYSTOLIC BLOOD PRESSURE: 130 MMHG | OXYGEN SATURATION: 97 % | TEMPERATURE: 97 F | HEART RATE: 78 BPM | BODY MASS INDEX: 15.94 KG/M2 | WEIGHT: 86.63 LBS | DIASTOLIC BLOOD PRESSURE: 60 MMHG | HEIGHT: 62 IN

## 2024-07-01 DIAGNOSIS — F39 MOOD DISORDER: ICD-10-CM

## 2024-07-01 PROCEDURE — 1101F PT FALLS ASSESS-DOCD LE1/YR: CPT | Mod: CPTII,S$GLB,, | Performed by: FAMILY MEDICINE

## 2024-07-01 PROCEDURE — 3075F SYST BP GE 130 - 139MM HG: CPT | Mod: CPTII,S$GLB,, | Performed by: FAMILY MEDICINE

## 2024-07-01 PROCEDURE — 3008F BODY MASS INDEX DOCD: CPT | Mod: CPTII,S$GLB,, | Performed by: FAMILY MEDICINE

## 2024-07-01 PROCEDURE — 99999 PR PBB SHADOW E&M-EST. PATIENT-LVL III: CPT | Mod: PBBFAC,,, | Performed by: FAMILY MEDICINE

## 2024-07-01 PROCEDURE — 1159F MED LIST DOCD IN RCRD: CPT | Mod: CPTII,S$GLB,, | Performed by: FAMILY MEDICINE

## 2024-07-01 PROCEDURE — 1126F AMNT PAIN NOTED NONE PRSNT: CPT | Mod: CPTII,S$GLB,, | Performed by: FAMILY MEDICINE

## 2024-07-01 PROCEDURE — 99214 OFFICE O/P EST MOD 30 MIN: CPT | Mod: S$GLB,,, | Performed by: FAMILY MEDICINE

## 2024-07-01 PROCEDURE — 3288F FALL RISK ASSESSMENT DOCD: CPT | Mod: CPTII,S$GLB,, | Performed by: FAMILY MEDICINE

## 2024-07-01 PROCEDURE — 1160F RVW MEDS BY RX/DR IN RCRD: CPT | Mod: CPTII,S$GLB,, | Performed by: FAMILY MEDICINE

## 2024-07-01 PROCEDURE — 3078F DIAST BP <80 MM HG: CPT | Mod: CPTII,S$GLB,, | Performed by: FAMILY MEDICINE

## 2024-07-01 RX ORDER — VENLAFAXINE HYDROCHLORIDE 37.5 MG/1
37.5 CAPSULE, EXTENDED RELEASE ORAL DAILY
Qty: 90 CAPSULE | Refills: 3 | Status: SHIPPED | OUTPATIENT
Start: 2024-07-01 | End: 2025-07-01

## 2024-07-10 NOTE — PROGRESS NOTES
Assessment & Plan  Problem List Items Addressed This Visit    None  Visit Diagnoses       Mood disorder        Relevant Medications    venlafaxine (EFFEXOR-XR) 37.5 MG 24 hr capsule           Assessment & Plan  1. Health maintenance.  Her blood pressure readings are within the normal range, averaging around 105 to 108 systolic. Her cholesterol levels are also within acceptable limits. The use of coconut oil-based cream is unlikely to significantly impact her cholesterol levels. Her white blood cell count is normal, indicating no need for B12 supplementation at this time. She has been informed about the potential limitations of her current medication, including the possibility of her body acclimating to the dosage over time. She is advised to continue with her current medication regimen. A 90-day prescription will be sent to [facility] mail order pharmacy.    Follow-up  She will follow up in 02/2025 or 03/2025.    Results        Health Maintenance reviewed.      ______________________________________________________________________    Chief Complaint  Chief Complaint   Patient presents with    Anxiety     Follow up med follow up       HPI  Mounika Barnett is a 73 y.o. female with multiple medical diagnoses as listed in the medical history and problem list that presents for anxiety.  Pt is known to me with last appointment 5/14/2024.    History of Present Illness  The patient presents for evaluation of multiple medical concerns.    She has been monitoring her blood pressure at home, which has shown a slight increase compared to her usual readings. She is also concerned about the potential impact of her medication on her cholesterol levels. She had been using a coconut oil-based cream, but discontinued it due to concerns about its effect on cholesterol. She is considering supplementing with B12. She wishes to continue her current medication regimen, as she believes it is helping her manage stress related to her  's dementia. She reports no adverse mood changes and feels her mood has improved overall. Initially, she experienced fatigue after starting the medication, but this subsided after 1 to 1.5 weeks. She does not participate in any support groups.           PAST MEDICAL HISTORY:  Past Medical History:   Diagnosis Date    Osteoarthritis of both hands 5/3/2018       PAST SURGICAL HISTORY:  Past Surgical History:   Procedure Laterality Date     SECTION      DILATION AND CURETTAGE OF UTERUS      TONSILLECTOMY         SOCIAL HISTORY:  Social History     Socioeconomic History    Marital status:    Tobacco Use    Smoking status: Never    Smokeless tobacco: Never   Substance and Sexual Activity    Alcohol use: No     Alcohol/week: 0.0 standard drinks of alcohol    Drug use: No    Sexual activity: Never     Partners: Male     Social Determinants of Health     Financial Resource Strain: Low Risk  (2/3/2024)    Overall Financial Resource Strain (CARDIA)     Difficulty of Paying Living Expenses: Not very hard   Food Insecurity: No Food Insecurity (2/3/2024)    Hunger Vital Sign     Worried About Running Out of Food in the Last Year: Never true     Ran Out of Food in the Last Year: Never true   Transportation Needs: No Transportation Needs (2/3/2024)    PRAPARE - Transportation     Lack of Transportation (Medical): No     Lack of Transportation (Non-Medical): No   Physical Activity: Sufficiently Active (2/3/2024)    Exercise Vital Sign     Days of Exercise per Week: 7 days     Minutes of Exercise per Session: 80 min   Stress: Stress Concern Present (2/3/2024)    [employer] - Occupational Stress Questionnaire     Feeling of Stress : To some extent   Housing Stability: Low Risk  (2/3/2024)    Housing Stability Vital Sign     Unable to Pay for Housing in the Last Year: No     Number of Places Lived in the Last Year: 1     Unstable Housing in the Last Year: No       FAMILY  "HISTORY:  Family History   Problem Relation Name Age of Onset    Hypertension Mother      Heart disease Mother      Cancer Father          liver [place]    Hypertension Brother Tree        ALLERGIES AND MEDICATIONS: updated and reviewed.  Review of patient's allergies indicates:  No Known Allergies  Current Outpatient Medications   Medication Sig Dispense Refill    calcium citrate/vitamin D2 (CALCIUM CITRATE WITH D ORAL) Take 1 tablet by mouth 2 (two) times daily. 600 mg+D3 20 mcg=800 IU      venlafaxine (EFFEXOR-XR) 37.5 MG 24 hr capsule Take 1 capsule (37.5 mg total) by mouth once daily. 90 capsule 3     No current facility-administered medications for this visit.         ROS  Review of Systems   Constitutional:  Negative for activity change, appetite change, fatigue, fever and unexpected weight change.   HENT: Negative.  Negative for ear discharge, ear pain, rhinorrhea and sore throat.    Eyes: Negative.    Respiratory:  Negative for apnea, cough, chest tightness, shortness of breath and wheezing.    Cardiovascular:  Negative for chest pain, palpitations and leg swelling.   Gastrointestinal:  Negative for abdominal distention, abdominal pain, constipation, diarrhea and vomiting.   Endocrine: Negative for cold intolerance, heat intolerance, polydipsia and polyuria.   Genitourinary:  Negative for decreased urine volume and urgency.   Musculoskeletal: Negative.    Skin:  Negative for rash.   Neurological:  Negative for dizziness and headaches.   Hematological:  Does not bruise/bleed easily.   Psychiatric/Behavioral:  Negative for agitation, sleep disturbance and suicidal ideas.            Physical Exam  Vitals:    07/01/24 1500   BP: 130/60   Pulse: 78   Temp: 97.4 °F (36.3 °C)   TempSrc: Oral   SpO2: 97%   Weight: 39.3 kg (86 lb 10.3 oz)   Height: 5' 2" (1.575 m)    Body mass index is 15.85 kg/m².  Weight: 39.3 kg (86 lb 10.3 oz)   Height: 5' 2" (157.5 cm)   Physical Exam  Vitals reviewed.   Constitutional:       " Appearance: Normal appearance. She is well-developed.   HENT:      Head: Normocephalic and atraumatic.      Right Ear: External ear normal.      Left Ear: External ear normal.      Nose: Nose normal.      Mouth/Throat:      Mouth: Mucous membranes are moist.      Pharynx: Oropharynx is clear.   Eyes:      Extraocular Movements: Extraocular movements intact.      Conjunctiva/sclera: Conjunctivae normal.      Pupils: Pupils are equal, round, and reactive to light.   Cardiovascular:      Rate and Rhythm: Normal rate and regular rhythm.      Heart sounds: Normal heart sounds.   Pulmonary:      Effort: Pulmonary effort is normal.      Breath sounds: Normal breath sounds.   Skin:     General: Skin is warm and dry.   Neurological:      Mental Status: She is alert and oriented to person, place, and time.        Physical Exam  Ears are clear with no wax.  Lungs are clear.  Heart sounds are normal.         Health Maintenance         Date Due Completion Date    TETANUS VACCINE Never done ---    RSV Vaccine (Age 60+ and Pregnant patients) (1 - 1-dose 60+ series) Never done ---    COVID-19 Vaccine (7 - 2023-24 season) 02/10/2024 10/10/2023    Influenza Vaccine (1) 09/01/2024 10/10/2023    Mammogram 02/06/2025 2/6/2024    DEXA Scan 02/06/2026 2/6/2024    Colorectal Cancer Screening 05/17/2026 5/17/2023    Lipid Panel 05/14/2029 5/14/2024                Patient note was created using [employer].  Any errors in syntax or even information may not have been identified and edited on initial review prior to signing this note.

## 2025-03-07 ENCOUNTER — OFFICE VISIT (OUTPATIENT)
Dept: FAMILY MEDICINE | Facility: CLINIC | Age: 75
End: 2025-03-07
Payer: MEDICARE

## 2025-03-07 VITALS
HEART RATE: 75 BPM | SYSTOLIC BLOOD PRESSURE: 140 MMHG | BODY MASS INDEX: 15.46 KG/M2 | OXYGEN SATURATION: 97 % | HEIGHT: 62 IN | DIASTOLIC BLOOD PRESSURE: 62 MMHG | WEIGHT: 84 LBS

## 2025-03-07 DIAGNOSIS — G89.29 CHRONIC LEFT SHOULDER PAIN: ICD-10-CM

## 2025-03-07 DIAGNOSIS — Z12.31 SCREENING MAMMOGRAM FOR BREAST CANCER: ICD-10-CM

## 2025-03-07 DIAGNOSIS — R03.0 ELEVATED BLOOD PRESSURE READING: Primary | ICD-10-CM

## 2025-03-07 DIAGNOSIS — M25.512 CHRONIC LEFT SHOULDER PAIN: ICD-10-CM

## 2025-03-07 DIAGNOSIS — F39 MOOD DISORDER: ICD-10-CM

## 2025-03-07 PROCEDURE — 3077F SYST BP >= 140 MM HG: CPT | Mod: CPTII,S$GLB,, | Performed by: FAMILY MEDICINE

## 2025-03-07 PROCEDURE — 1159F MED LIST DOCD IN RCRD: CPT | Mod: CPTII,S$GLB,, | Performed by: FAMILY MEDICINE

## 2025-03-07 PROCEDURE — 99999 PR PBB SHADOW E&M-EST. PATIENT-LVL IV: CPT | Mod: PBBFAC,,, | Performed by: FAMILY MEDICINE

## 2025-03-07 PROCEDURE — 1126F AMNT PAIN NOTED NONE PRSNT: CPT | Mod: CPTII,S$GLB,, | Performed by: FAMILY MEDICINE

## 2025-03-07 PROCEDURE — 1101F PT FALLS ASSESS-DOCD LE1/YR: CPT | Mod: CPTII,S$GLB,, | Performed by: FAMILY MEDICINE

## 2025-03-07 PROCEDURE — 99214 OFFICE O/P EST MOD 30 MIN: CPT | Mod: S$GLB,,, | Performed by: FAMILY MEDICINE

## 2025-03-07 PROCEDURE — 3078F DIAST BP <80 MM HG: CPT | Mod: CPTII,S$GLB,, | Performed by: FAMILY MEDICINE

## 2025-03-07 PROCEDURE — 1160F RVW MEDS BY RX/DR IN RCRD: CPT | Mod: CPTII,S$GLB,, | Performed by: FAMILY MEDICINE

## 2025-03-07 PROCEDURE — 3008F BODY MASS INDEX DOCD: CPT | Mod: CPTII,S$GLB,, | Performed by: FAMILY MEDICINE

## 2025-03-07 PROCEDURE — 3288F FALL RISK ASSESSMENT DOCD: CPT | Mod: CPTII,S$GLB,, | Performed by: FAMILY MEDICINE

## 2025-03-07 RX ORDER — DESVENLAFAXINE SUCCINATE 25 MG/1
1 TABLET, EXTENDED RELEASE ORAL DAILY
Qty: 30 TABLET | Refills: 0 | Status: SHIPPED | OUTPATIENT
Start: 2025-03-07

## 2025-03-07 RX ORDER — VENLAFAXINE HYDROCHLORIDE 37.5 MG/1
37.5 CAPSULE, EXTENDED RELEASE ORAL DAILY
Qty: 90 CAPSULE | Refills: 3 | Status: CANCELLED | OUTPATIENT
Start: 2025-03-07 | End: 2026-03-07

## 2025-03-07 NOTE — PROGRESS NOTES
Assessment & Plan  Problem List Items Addressed This Visit    None  Visit Diagnoses         Elevated blood pressure reading    -  Primary      Screening mammogram for breast cancer        Relevant Orders    Mammo Digital Screening Bilat      Mood disorder        Relevant Medications    desvenlafaxine succinate (PRISTIQ) 25 mg Tb24      Chronic left shoulder pain        Relevant Orders    Ambulatory referral/consult to Physical/Occupational Therapy           Assessment & Plan  1. Blood pressure management.  Her blood pressure readings have shown some variability, with one instance of elevation to 131 and three other instances of elevated readings. Of particular concern is a reading of 151/90, which was recorded in the evening. She has been on venlafaxine for approximately 6 months, starting with a 30-day supply from [facility] and then a 90-day supply from a mail-order pharmacy. A transition from venlafaxine to desvenlafaxine (Pristiq) 25 mg will be initiated, maintaining the same dosage schedule as before. A 30-day supply will be sent to [facility]. She is advised to continue monitoring her blood pressure while on the new medication.    2. Irregular heartbeat.  She reports experiencing irregular heartbeats. This is a concern, especially in conjunction with her elevated blood pressure readings. The change in medication to desvenlafaxine (Pristiq) 25 mg is also aimed at addressing this issue. Continued monitoring of her heart rhythm is recommended.    3. Left shoulder pain.  She reports left shoulder pain, which worsens in cold weather and sometimes prevents her from lying on her left side. The pain is suspected to be genetic, as her mother and daughter have experienced similar issues. A referral for a physical therapy evaluation will be made to assess and treat the shoulder pain.    Results        Health Maintenance reviewed.      ______________________________________________________________________    Chief  Complaint  Chief Complaint   Patient presents with    Hypertension       HPI  Mounika Barnett is a 74 y.o. female with multiple medical diagnoses as listed in the medical history and problem list that presents for hypertension.  Pt is known to me with last appointment 2024 .    History of Present Illness  The patient presents for evaluation of blood pressure, irregular heartbeat, and left shoulder pain. She is accompanied by her .    She has been on venlafaxine for approximately 6 months, with the first 30-day supply obtained from [facility] and the subsequent 90-day supply from a mail-order pharmacy. She reports that her blood pressure was within normal limits during her last visit 6 months ago.    She also reports experiencing irregular heartbeats.    Additionally, she has been experiencing pain in her left shoulder, which she believes may be a hereditary condition as her mother and daughter have similar symptoms. The pain intensifies during cold weather, to the point where she is unable to lie on her left side. Her daughter has suggested a physical therapy evaluation as a potential treatment option. The pain intensifies during cold weather.    FAMILY HISTORY  Her mother had left shoulder pain. Her daughter also has left shoulder pain.    MEDICATIONS  Current: venlafaxine           PAST MEDICAL HISTORY:  Past Medical History:   Diagnosis Date    Osteoarthritis of both hands 5/3/2018       PAST SURGICAL HISTORY:  Past Surgical History:   Procedure Laterality Date     SECTION      DILATION AND CURETTAGE OF UTERUS      TONSILLECTOMY         SOCIAL HISTORY:  Social History[1]    FAMILY HISTORY:  Family History   Problem Relation Name Age of Onset    Hypertension Mother      Heart disease Mother      Cancer Father          liver [place]    Hypertension Brother Tree        ALLERGIES AND MEDICATIONS: updated and reviewed.  Review of patient's allergies indicates:  No Known Allergies  Current  "Medications[2]      ROS  Review of Systems   Constitutional:  Negative for activity change, appetite change, fatigue, fever and unexpected weight change.   HENT: Negative.  Negative for ear discharge, ear pain, rhinorrhea and sore throat.    Eyes: Negative.    Respiratory:  Negative for apnea, cough, chest tightness, shortness of breath and wheezing.    Cardiovascular:  Negative for chest pain, palpitations and leg swelling.   Gastrointestinal:  Negative for abdominal distention, abdominal pain, constipation, diarrhea and vomiting.   Endocrine: Negative for cold intolerance, heat intolerance, polydipsia and polyuria.   Genitourinary:  Negative for decreased urine volume and urgency.   Musculoskeletal:  Positive for arthralgias.   Skin:  Negative for rash.   Neurological:  Negative for dizziness and headaches.   Hematological:  Does not bruise/bleed easily.   Psychiatric/Behavioral:  Negative for agitation, sleep disturbance and suicidal ideas.            Physical Exam  Vitals:    03/07/25 1315   BP: (!) 140/62   Pulse: 75   SpO2: 97%   Weight: 38.1 kg (83 lb 15.9 oz)   Height: 5' 2" (1.575 m)    Body mass index is 15.36 kg/m².  Weight: 38.1 kg (83 lb 15.9 oz)   Height: 5' 2" (157.5 cm)   Physical Exam  Vitals reviewed.   Constitutional:       Appearance: Normal appearance. She is well-developed.   HENT:      Head: Normocephalic and atraumatic.      Right Ear: External ear normal.      Left Ear: External ear normal.      Nose: Nose normal.      Mouth/Throat:      Mouth: Mucous membranes are moist.      Pharynx: Oropharynx is clear.   Eyes:      Extraocular Movements: Extraocular movements intact.      Conjunctiva/sclera: Conjunctivae normal.      Pupils: Pupils are equal, round, and reactive to light.   Cardiovascular:      Rate and Rhythm: Normal rate and regular rhythm.      Heart sounds: Normal heart sounds.   Pulmonary:      Effort: Pulmonary effort is normal.      Breath sounds: Normal breath sounds.   Skin:     " General: Skin is warm and dry.   Neurological:      Mental Status: She is alert and oriented to person, place, and time.        Physical Exam  There is a little bit of wax in the ears.  Heart sounds are normal.         Health Maintenance         Date Due Completion Date    TETANUS VACCINE Never done ---    COVID-19 Vaccine (7 - 2024-25 season) 09/01/2024 10/10/2023    Mammogram 02/06/2025 2/6/2024    DEXA Scan 02/06/2026 2/6/2024    Colorectal Cancer Screening 05/17/2026 5/17/2023    RSV Vaccine (Age 60+ and Pregnant patients) (1 - 1-dose 75+ series) 10/04/2026 ---    Lipid Panel 05/14/2029 5/14/2024                Patient note was created using [employer].  Any errors in syntax or even information may not have been identified and edited on initial review prior to signing this note.         [1]   Social History  Socioeconomic History    Marital status:    Tobacco Use    Smoking status: Never    Smokeless tobacco: Never   Substance and Sexual Activity    Alcohol use: No     Alcohol/week: 0.0 standard drinks of alcohol    Drug use: No    Sexual activity: Never     Partners: Male     Social Drivers of Health     Financial Resource Strain: Low Risk  (2/3/2024)    Overall Financial Resource Strain (CARDIA)     Difficulty of Paying Living Expenses: Not very hard   Food Insecurity: No Food Insecurity (2/3/2024)    Hunger Vital Sign     Worried About Running Out of Food in the Last Year: Never true     Ran Out of Food in the Last Year: Never true   Transportation Needs: No Transportation Needs (2/3/2024)    PRAPARE - Transportation     Lack of Transportation (Medical): No     Lack of Transportation (Non-Medical): No   Physical Activity: Sufficiently Active (2/3/2024)    Exercise Vital Sign     Days of Exercise per Week: 7 days     Minutes of Exercise per Session: 80 min   Stress: Stress Concern Present (2/3/2024)    [employer] - Occupational Stress Questionnaire     Feeling of Stress : To  some extent   Housing Stability: Low Risk  (2/3/2024)    Housing Stability Vital Sign     Unable to Pay for Housing in the Last Year: No     Number of Places Lived in the Last Year: 1     Unstable Housing in the Last Year: No   [2]   Current Outpatient Medications   Medication Sig Dispense Refill    calcium citrate/vitamin D2 (CALCIUM CITRATE WITH D ORAL) Take 1 tablet by mouth 2 (two) times daily. 600 mg+D3 20 mcg=800 IU      desvenlafaxine succinate (PRISTIQ) 25 mg Tb24 Take 1 tablet (25 mg total) by mouth once daily. 30 tablet 0     No current facility-administered medications for this visit.

## 2025-03-25 ENCOUNTER — HOSPITAL ENCOUNTER (OUTPATIENT)
Dept: RADIOLOGY | Facility: HOSPITAL | Age: 75
Discharge: HOME | End: 2025-03-25
Attending: FAMILY MEDICINE
Payer: MEDICARE

## 2025-03-25 DIAGNOSIS — Z12.31 SCREENING MAMMOGRAM FOR BREAST CANCER: ICD-10-CM

## 2025-03-25 PROCEDURE — 77067 SCR MAMMO BI INCL CAD: CPT | Mod: 26,,, | Performed by: RADIOLOGY

## 2025-03-25 PROCEDURE — 77063 BREAST TOMOSYNTHESIS BI: CPT | Mod: 26,,, | Performed by: RADIOLOGY

## 2025-03-25 PROCEDURE — 77063 BREAST TOMOSYNTHESIS BI: CPT | Mod: TC,PO

## 2025-03-26 ENCOUNTER — RESULTS FOLLOW-UP (OUTPATIENT)
Dept: FAMILY MEDICINE | Facility: CLINIC | Age: 75
End: 2025-03-26

## 2025-03-26 DIAGNOSIS — F39 MOOD DISORDER: ICD-10-CM

## 2025-03-26 RX ORDER — DESVENLAFAXINE SUCCINATE 25 MG/1
TABLET, EXTENDED RELEASE ORAL
Refills: 0 | OUTPATIENT
Start: 2025-03-26

## 2025-03-26 NOTE — TELEPHONE ENCOUNTER
No care due was identified.  [facility] Embedded Care Due Messages. Reference number: [id number].   3/26/2025 11:42:29 AM CDT

## 2025-03-26 NOTE — TELEPHONE ENCOUNTER
No care due was identified.  [facility] Embedded Care Due Messages. Reference number: [id number].   3/26/2025 11:41:22 AM CDT

## 2025-03-26 NOTE — TELEPHONE ENCOUNTER
Refill Decision Note   Mounika Barnett  is requesting a refill authorization.  Brief Assessment and Rationale for Refill:  Quick Discontinue     Medication Therapy Plan: Pharmacy is requesting new scripts for the following medications without required information, (sig/ frequency/qty/etc)     Medication Reconciliation Completed: No   Comments:     No Care Gaps recommended.     Note composed:4:02 PM 03/26/2025

## 2025-04-05 DIAGNOSIS — F39 MOOD DISORDER: ICD-10-CM

## 2025-04-05 NOTE — TELEPHONE ENCOUNTER
No care due was identified.  [facility] Embedded Care Due Messages. Reference number: [id number].   4/05/2025 9:49:17 AM CDT

## 2025-04-06 NOTE — TELEPHONE ENCOUNTER
Refill Routing Note   Medication(s) are not appropriate for processing by [facility] for the following reason(s):        Required vitals abnormal    [facility] action(s):  Defer        Medication Therapy Plan: 03/07/25 (!) 140/62      Appointments  past 12m or future 3m with PCP    Date Provider   Last Visit   3/7/2025 Helga Crain MD   Next Visit   6/6/2025 Helga Crain MD   ED visits in past 90 days: 0        Note composed:12:11 PM 04/06/2025

## 2025-04-07 DIAGNOSIS — F39 MOOD DISORDER: ICD-10-CM

## 2025-04-07 RX ORDER — DESVENLAFAXINE SUCCINATE 25 MG/1
TABLET, EXTENDED RELEASE ORAL
Qty: 90 TABLET | Refills: 2 | Status: SHIPPED | OUTPATIENT
Start: 2025-04-07

## 2025-04-07 NOTE — TELEPHONE ENCOUNTER
No care due was identified.  [facility] Embedded Care Due Messages. Reference number: [id number].   4/07/2025 11:58:27 AM CDT

## 2025-04-10 RX ORDER — DESVENLAFAXINE SUCCINATE 25 MG/1
1 TABLET, EXTENDED RELEASE ORAL DAILY
Qty: 30 TABLET | Refills: 0 | OUTPATIENT
Start: 2025-04-10

## 2025-04-10 NOTE — TELEPHONE ENCOUNTER
Refill Decision Note   Mounika Barnett  is requesting a refill authorization.  Brief Assessment and Rationale for Refill:  Quick Discontinue     Medication Therapy Plan:  E-Prescribing Status: Receipt confirmed by pharmacy (4/7/2025 ) to [facility]      Comments:     Note composed:3:16 PM 04/10/2025

## 2025-04-14 ENCOUNTER — CLINICAL SUPPORT (OUTPATIENT)
Dept: REHABILITATION | Facility: HOSPITAL | Age: 75
End: 2025-04-14
Attending: FAMILY MEDICINE
Payer: MEDICARE

## 2025-04-14 DIAGNOSIS — G89.29 CHRONIC LEFT SHOULDER PAIN: ICD-10-CM

## 2025-04-14 DIAGNOSIS — R29.898 WEAKNESS OF LEFT UPPER EXTREMITY: Primary | ICD-10-CM

## 2025-04-14 DIAGNOSIS — M25.512 CHRONIC LEFT SHOULDER PAIN: ICD-10-CM

## 2025-04-14 PROCEDURE — 97161 PT EVAL LOW COMPLEX 20 MIN: CPT | Mod: PN

## 2025-04-14 PROCEDURE — 97112 NEUROMUSCULAR REEDUCATION: CPT | Mod: PN

## 2025-04-14 NOTE — PROGRESS NOTES
Outpatient Rehab    Physical Therapy Evaluation    Patient Name: Mounika Barnett  MRN: [id number]  YOB: 1950  Encounter Date: 4/14/2025    Therapy Diagnosis:   Encounter Diagnoses   Name Primary?    Chronic left shoulder pain     Weakness of left upper extremity Yes     Physician: Helga Crain MD    Physician Orders: Eval and Treat  Medical Diagnosis: Chronic left shoulder pain    Visit # / Visits Authorized:  1 / 1  Insurance Authorization Period: 3/7/2025 to 12/31/2025  Date of Evaluation: 4/14/2025  Plan of Care Certification: 4/14/2025 to 7/7/25     Time In: 1200   Time Out: 1245  Total Time: 45   Total Billable Time: 45    Intake Outcome Measure for FOTO Survey    Therapist reviewed FOTO scores for Mounika Barnett on 4/14/2025.   FOTO report - see Media section or FOTO account episode details.     Intake Score: 13.3%         Subjective   History of Present Illness  Mounika is a 74 y.o. female who reports to physical therapy with a chief concern of L shoulder pain.     The patient reports a medical diagnosis of Chronic left shoulder pain.    Diagnostic tests related to this condition: None.        History of Present Condition/Illness: Pt presents to PT with c/o L shoulder pain that began approximately timeframe ago. Reports difficulty with overhead activites, and participating in previous activities (e.g., sports, stairs, etc.). Pain is localized to the L shoulder and may radiate to her elbow.  Denies neck pain. Pt rates pain as /10 at worst, X/10 at rest. Use descriptors like dull, aching, sharp if provided. Pt states she has more pain lying down on left side.     Activities of Daily Living  Social history was obtained from Patient.               Previously independent with activities of daily living? Yes     Currently independent with activities of daily living? No  Activities currently needing assistance include Dressing - upper body and Functional mobility.        Previously independent with  instrumental activities of daily living? Yes     Currently independent with instrumental activities of daily living? No  Activities currently needing assistance include: Driving and Grocery/shopping.            Pain     Patient reports a current pain level of 3/10. Pain at best is reported as 1/10. Pain at worst is reported as 8/10.   Location: L shoulder and L upper traps  Clinical Progression (since onset): Stable  Pain Qualities: Aching, Sharp, Burning  Pain-Relieving Factors: Medications - over-the-counter  Pain-Aggravating Factors: Bending, Cooking, Driving, Lifting, Movement, Reaching         Treatment History  Treatments  Previously Received Treatments: No  Currently Receiving Treatments: No    Employment  Patient does not report that: Does the patient's condition impact their ability to work?  Employment Status: Employed full-time          Past Medical History/Physical Systems Review:   Mounika Barnett  has a past medical history of Osteoarthritis of both hands.    Mounika Barnett  has a past surgical history that includes  section; Tonsillectomy; and Dilation and curettage of uterus.    Mounika Howell has a current medication list which includes the following prescription(s): calcium citrate/vitamin d2 and desvenlafaxine succinate.    Review of patient's allergies indicates:  No Known Allergies     Objective   Posture        Shoulders are Rounded.             Upper Extremity Sensation  General Upper Extremity Sensation  Intact: Right and Left  Right Upper Extremity Sensation  Intact: Light Touch, Sharp/Dull Discrimination, Kinesthesia, Proprioception, and Hot/Cold Discrimination  Right Upper Extremity Sensation Stocking Glove Pattern: No    Left Upper Extremity Sensation  Intact: Light Touch, Sharp/Dull Discrimination, Kinesthesia, Proprioception, and Hot/Cold Discrimination  Left Upper Extremity Sensation Stocking Glove Pattern: No             Shoulder Palpation          Left Shoulder  Palpation  Abnormal: Muscle  Left Shoulder Muscle Palpation Observations: supraspinatus pain           Shoulder Range of Motion  Right Shoulder   Active (deg) Passive (deg) Pain   Flexion 170       Extension         Scaption         ABduction 170       ADduction         Horizontal ABduction         Horizontal ADduction         External Rotation (Shoulder ABducted 0 degrees)  (Functional reach:T3)       External Rotation (Shoulder ABducted 45 degrees)         External Rotation (Shoulder ABducted 90 degrees)         Internal Rotation (Shoulder ABducted 0 degrees)  (Functional reach:T7)       Internal Rotation (Shoulder ABducted 45 degrees)         Internal Rotation (Shoulder ABducted 90 degrees)           Left Shoulder   Active (deg) Passive (deg) Pain   Flexion 170   Yes   Extension         Scaption         ABduction 170   Yes   ADduction         Horizontal ABduction         Horizontal ADduction         External Rotation (Shoulder ABducted 0 degrees)  (Functional reach:T3)   Yes   External Rotation (Shoulder ABducted 45 degrees)         External Rotation (Shoulder ABducted 90 degrees)         Internal Rotation (Shoulder ABducted 0 degrees)  (Functional reach:T7)   Yes   Internal Rotation (Shoulder ABducted 45 degrees)         Internal Rotation (Shoulder ABducted 90 degrees)                       Shoulder Strength - Planes of Motion   Right Strength Right Pain Left Strength Left  Pain   Flexion           Extension           ABduction           ADduction           Horizontal ABduction           Horizontal ADduction           Internal Rotation 0°           Internal Rotation 90°           External Rotation 0°           External Rotation 90°               Shoulder Strength - Rotator Cuff Muscles   Right Strength Right Pain Left Strength Left  Pain   Supraspinatus 4+   3+ Yes   Infraspinatus 4+   3+ Yes   Teres Minor 4+   3+ Yes   Subscapularis 4+   3+ Yes               Shoulder Special Tests  Impingement Tests  Positive:  Left Cross Body ADduction, Left Andrew-Jero, Left Neer's, and Left Painful Arc  Negative: Left Infraspinatus Muscle              Treatment:  Balance/Neuromuscular Re-Education  NMR 1: Isometric shoulder 4 ways  NMR 2: Pendulum circles CW and CWW    Time Entry(in minutes):  PT Evaluation (Low) Time Entry: 35  Neuromuscular Re-Education Time Entry: 10    Assessment & Plan   Assessment  Mounika presents with a condition of Low complexity.   Presentation of Symptoms: Stable  Will Comorbidities Impact Care: No       Functional Limitations: Activity tolerance, Driving, Pain when reaching, Participating in leisure activities, Range of motion  Impairments: Impaired physical strength, Lack of appropriate home exercise program, Pain with functional activity  Personal Factors Affecting Prognosis: Pain, Schedule    Patient Goal for Therapy (PT): decrease pain  Prognosis: Good  Assessment Details: Mounika was referred to outpatient Physical Therapy with a medical diagnosis of M25.512,G89.29 (ICD-10-CM) - Chronic left shoulder pain. Upon evaluation, patient presents with decreased ROM, muscle strength, flexibility, and joint mobility. Patient also presents with increased pain, stiffness, and soft tissue restrictions, as well as impaired posture, joint mechanics, balance, and gait pattern. PT will focus on addressing the patient's impairments to enhance function and assist in returning the [facility] to [facility]. The subjective and objective findings are addressed through specific goals outlined in the plan of care.     Plan  From a physical therapy perspective, the patient would benefit from: Skilled Rehab Services    Planned therapy interventions include: Therapeutic exercise, Therapeutic activities, Neuromuscular re-education, Manual therapy, and ADLs/IADLs.            Visit Frequency: 2 times Per Week for 12 Weeks.       This plan was discussed with Patient.   Discussion participants: Agreed Upon Plan of Care             Patient's  spiritual, cultural, and educational needs considered and patient agreeable to plan of care and goals.     Education  Education was done with Patient. The patient's learning style includes Demonstration, Listening, and Pictures/video. The patient Demonstrates understanding and Verbalizes understanding.                 Goals:   Active       Long term goals        2. Independent with home exercise program.         Start:  04/14/25    Expected End:  07/07/25            3. Full return to functional activities with manageable complaints.         Start:  04/14/25    Expected End:  07/07/25            4. Patient to demonstrate improved posture and body mechanics.         Start:  04/14/25    Expected End:  07/07/25            5.Decrease pain by 75% during functional activities.         Start:  04/14/25    Expected End:  07/07/25               Short term goals       Pt to increase strength by a 1/2 grade of muscles test to allow for improvement in functional activities such as performing chores.          Start:  04/14/25    Expected End:  07/07/25            3. Pt to report compliance with HEP and demonstrate proper exercise technique to PT to show competence with self management of condition.         Start:  04/14/25    Expected End:  07/07/25            4. Decrease pain by 25% during functional activities.         Start:  04/14/25    Expected End:  07/07/25                Kalyan May, PT

## 2025-04-22 ENCOUNTER — CLINICAL SUPPORT (OUTPATIENT)
Dept: REHABILITATION | Facility: HOSPITAL | Age: 75
End: 2025-04-22
Payer: MEDICARE

## 2025-04-22 DIAGNOSIS — R29.898 WEAKNESS OF LEFT UPPER EXTREMITY: Primary | ICD-10-CM

## 2025-04-22 PROCEDURE — 97112 NEUROMUSCULAR REEDUCATION: CPT | Mod: PN

## 2025-04-22 NOTE — PROGRESS NOTES
Outpatient Rehab    Physical Therapy Visit    Patient Name: Mounika Barnett  MRN: [id number]  YOB: 1950  Encounter Date: 4/22/2025    Therapy Diagnosis:   Encounter Diagnosis   Name Primary?    Weakness of left upper extremity Yes     Physician: Helga Crain MD    Physician Orders: Eval and Treat  Medical Diagnosis:     Visit # / Visits Authorized:  1 / 20  Insurance Authorization Period: 4/14/2025 to 12/31/2025  Date of Evaluation: 4/14/2025  Plan of Care Certification: 4/14/2025 to 7/7/25      PT/PTA:     Number of PTA visits since last PT visit:   Time In: 1000   Time Out: 1050  Total Time: 50   Total Billable Time: 10    FOTO:  Intake Score:  %  Survey Score 1:  %  Survey Score 2:  %         Subjective   Pt states she is feeling much better. She has less L shoulder pain.  Pain reported as 0/10.      Objective            Treatment:  Therapeutic Exercise  TE 1: Pulleys flexion 3 min  TE 2: Ruperto abd 3 min  TE 3: supine shoulder flexion 1# wand 2x10  TE 4: supine chest press 1# wand 2x10  TE 5: supine serratus punch 1# wand 2x10  Balance/Neuromuscular Re-Education  NMR 1: Isometric shoulder 4 ways  NMR 2: Pendulum circles CW and CWW  NMR 3: Wall walk 30x  NMR 4: SL shoulder abd 2x10  NMR 5: SL shoulder flexion 2x10  NMR 6: SL shoulder ER 2x10  NMR 7: UBE 6 min postural msucles control    Time Entry(in minutes):  Neuromuscular Re-Education Time Entry: 10    Assessment & Plan   Assessment: Pt presented to therapy for first f/u. We went over HEP. Pt presented to therapy with less pain. We perform shoulder stabiltiy and mobility exericses. Pt required mod v/c's to perform exericses. Pt did have provocation of pain during SL shoulder abd. Pain subsided in the end of PT session. Plan to cont wtih POC.       Patient will continue to benefit from skilled outpatient physical therapy to address the deficits listed in the problem list box on initial evaluation, provide pt/family education and to maximize  pt's level of independence in the home and community environment.     Patient's spiritual, cultural, and educational needs considered and patient agreeable to plan of care and goals.           Plan: Cont with POC    Goals:   Active       Long term goals        2. Independent with home exercise program.         Start:  04/14/25    Expected End:  07/07/25            3. Full return to functional activities with manageable complaints.         Start:  04/14/25    Expected End:  07/07/25            4. Patient to demonstrate improved posture and body mechanics.         Start:  04/14/25    Expected End:  07/07/25            5.Decrease pain by 75% during functional activities.         Start:  04/14/25    Expected End:  07/07/25               Short term goals       Pt to increase strength by a 1/2 grade of muscles test to allow for improvement in functional activities such as performing chores.          Start:  04/14/25    Expected End:  07/07/25            3. Pt to report compliance with HEP and demonstrate proper exercise technique to PT to show competence with self management of condition.         Start:  04/14/25    Expected End:  07/07/25            4. Decrease pain by 25% during functional activities.         Start:  04/14/25    Expected End:  07/07/25                Kalyan May, PT

## 2025-05-02 ENCOUNTER — CLINICAL SUPPORT (OUTPATIENT)
Dept: REHABILITATION | Facility: HOSPITAL | Age: 75
End: 2025-05-02
Payer: MEDICARE

## 2025-05-02 DIAGNOSIS — R29.898 WEAKNESS OF LEFT UPPER EXTREMITY: Primary | ICD-10-CM

## 2025-05-02 PROCEDURE — 97112 NEUROMUSCULAR REEDUCATION: CPT | Mod: PN,CQ

## 2025-05-02 NOTE — PROGRESS NOTES
Outpatient Rehab    Physical Therapy Visit    Patient Name: Mounika Barnett  MRN: [id number]  YOB: 1950  Encounter Date: 5/2/2025    Therapy Diagnosis:   No diagnosis found.    Physician: Helga Crain MD    Physician Orders: Eval and Treat  Medical Diagnosis:     Visit # / Visits Authorized:  2 / 12  Insurance Authorization Period: 4/14/2025 to 6/12/2025  Date of Evaluation: 4/14/2025  Plan of Care Certification: 4/14/2025 to 7/7/25      PT/PTA: PTA   Number of PTA visits since last PT visit:1  Time In: 1000   Time Out: 1058  Total Time: 58   Total Billable Time: 30    FOTO:  Intake Score:  %  Survey Score 1:  %  Survey Score 2:  %         Subjective   She thinks the shoulder is feeling better even though it's only been a few visits..         Objective            Treatment:  Therapeutic Exercise  TE 1: Pulleys flexion 3 min  TE 2: Ruperto abd 3 min  TE 3: supine shoulder flexion +2# wand +3x10  TE 4: supine chest press +2# wand +3x10  TE 5: supine serratus punch +2# wand +3x10  Balance/Neuromuscular Re-Education  NMR 1: Isometric shoulder 4 ways  NMR 3: Wall walk 30x  NMR 4: SL shoulder abd +3x10  NMR 5: SL shoulder flexion +3x10  NMR 6: SL shoulder ER +3x10  NMR 7: UBE 6 min postural msucles control    Time Entry(in minutes):       Assessment & Plan   Assessment: Ms. Ribera presented to PT today reporting slight improvements with left shoulder pain but continued occurences of pain.  Continued with exercises focused on improved left shoulder ROM and strength, progressing number of sets to most exercises, as well as increased resistance to supine shoulder exerciese.  She required occasional verbal/tactile cues to perform exercises with proper form and mm activation and was able to slightly improve.       Patient will continue to benefit from skilled outpatient physical therapy to address the deficits listed in the problem list box on initial evaluation, provide pt/family education and to maximize pt's  level of independence in the home and community environment.     Patient's spiritual, cultural, and educational needs considered and patient agreeable to plan of care and goals.           Plan:      Goals:   Active       Long term goals        2. Independent with home exercise program.         Start:  04/14/25    Expected End:  07/07/25            3. Full return to functional activities with manageable complaints.         Start:  04/14/25    Expected End:  07/07/25            4. Patient to demonstrate improved posture and body mechanics.         Start:  04/14/25    Expected End:  07/07/25            5.Decrease pain by 75% during functional activities.         Start:  04/14/25    Expected End:  07/07/25               Short term goals       Pt to increase strength by a 1/2 grade of muscles test to allow for improvement in functional activities such as performing chores.          Start:  04/14/25    Expected End:  07/07/25            3. Pt to report compliance with HEP and demonstrate proper exercise technique to PT to show competence with self management of condition.         Start:  04/14/25    Expected End:  07/07/25            4. Decrease pain by 25% during functional activities.         Start:  04/14/25    Expected End:  07/07/25                Mei Pedroza, PTA

## 2025-05-05 ENCOUNTER — TELEPHONE (OUTPATIENT)
Dept: FAMILY MEDICINE | Facility: CLINIC | Age: 75
End: 2025-05-05
Payer: MEDICARE

## 2025-05-05 NOTE — TELEPHONE ENCOUNTER
LVM with patient to return call back to clinic regarding visit. Afternoon clinic will be close need to change visit to the morning.

## 2025-05-05 NOTE — TELEPHONE ENCOUNTER
----- Message from Marlena sent at 5/5/2025  9:33 AM CDT -----  Type:  Patient Returning Call Who Called:Self  Who Left Message for Patient:PRITESH Does the patient know what this is regarding?:Pt stated someone called about her appt Would the patient rather a call back or a response via [facility]?  Call back Best Call Back Number:.[phone] or [place] [phone] Additional Information: Thank you.

## 2025-05-05 NOTE — PROGRESS NOTES
Outpatient Rehab    Physical Therapy Visit    Patient Name: Mounika Barnett  MRN: [id number]  YOB: 1950  Encounter Date: 5/6/2025    Therapy Diagnosis:   No diagnosis found.    Physician: Helga Crain MD    Physician Orders: Eval and Treat  Medical Diagnosis:     Visit # / Visits Authorized:  3 / 12  Insurance Authorization Period: 4/14/2025 to 6/12/2025  Date of Evaluation: 4/14/2025  Plan of Care Certification: 4/14/2025 to 7/7/25      PT/PTA: PTA   Number of PTA visits since last PT visit:2  Time In: 1000   Time Out: 1059  Total Time: 59   Total Billable Time: 57    FOTO:  Intake Score:  %  Survey Score 1:  %  Survey Score 2:  %         Subjective   She still has some discomfort in the left shoulder joint but it's getting better..         Objective            Treatment:  Therapeutic Exercise  TE 1: Pulleys flexion 3 min  TE 2: Ruperto abd 3 min  TE 3: supine shoulder flexion +3.5# wand +3x10  TE 4: supine chest press +3.5# wand +3x10  TE 5: supine serratus punch +3.5# wand +3x10  TE 6: +Landmines at EOT w/ pvc pipe 3x10  Balance/Neuromuscular Re-Education  NMR 1: Ball circles on wal CW/CCW 30x    Time Entry(in minutes):  Neuromuscular Re-Education Time Entry: 32  Therapeutic Exercise Time Entry: 25    Assessment & Plan   Assessment: Ms. Ribera presented to PT today with continued reports of decreased left shoulder pain intensity but continued occurences of pain.  Increased resistance, as well as  introduced resistance to occasional exercises for improved left shoulder mm strength and stability. She reported occasional occurences of left shoulder pain which decreased following the first few reps of the exercise.  Gerry continue to progress as tolerated.       Patient will continue to benefit from skilled outpatient physical therapy to address the deficits listed in the problem list box on initial evaluation, provide pt/family education and to maximize pt's level of independence in the home and  community environment.     Patient's spiritual, cultural, and educational needs considered and patient agreeable to plan of care and goals.           Plan: Cont with POC    Goals:   Active       Long term goals        2. Independent with home exercise program.         Start:  04/14/25    Expected End:  07/07/25            3. Full return to functional activities with manageable complaints.         Start:  04/14/25    Expected End:  07/07/25            4. Patient to demonstrate improved posture and body mechanics.         Start:  04/14/25    Expected End:  07/07/25            5.Decrease pain by 75% during functional activities.         Start:  04/14/25    Expected End:  07/07/25               Short term goals       Pt to increase strength by a 1/2 grade of muscles test to allow for improvement in functional activities such as performing chores.          Start:  04/14/25    Expected End:  07/07/25            3. Pt to report compliance with HEP and demonstrate proper exercise technique to PT to show competence with self management of condition.         Start:  04/14/25    Expected End:  07/07/25            4. Decrease pain by 25% during functional activities.         Start:  04/14/25    Expected End:  07/07/25                Mei Pedroza PTA

## 2025-05-06 ENCOUNTER — CLINICAL SUPPORT (OUTPATIENT)
Dept: REHABILITATION | Facility: HOSPITAL | Age: 75
End: 2025-05-06
Payer: MEDICARE

## 2025-05-06 DIAGNOSIS — G89.29 CHRONIC LEFT SHOULDER PAIN: ICD-10-CM

## 2025-05-06 DIAGNOSIS — R29.898 WEAKNESS OF LEFT UPPER EXTREMITY: Primary | ICD-10-CM

## 2025-05-06 DIAGNOSIS — M25.512 CHRONIC LEFT SHOULDER PAIN: ICD-10-CM

## 2025-05-06 PROCEDURE — 97112 NEUROMUSCULAR REEDUCATION: CPT | Mod: PN,CQ

## 2025-05-06 PROCEDURE — 97110 THERAPEUTIC EXERCISES: CPT | Mod: PN,CQ

## 2025-05-13 ENCOUNTER — CLINICAL SUPPORT (OUTPATIENT)
Dept: REHABILITATION | Facility: HOSPITAL | Age: 75
End: 2025-05-13
Payer: MEDICARE

## 2025-05-13 DIAGNOSIS — R29.898 WEAKNESS OF LEFT UPPER EXTREMITY: Primary | ICD-10-CM

## 2025-05-13 PROCEDURE — 97112 NEUROMUSCULAR REEDUCATION: CPT | Mod: PN

## 2025-05-13 PROCEDURE — 97110 THERAPEUTIC EXERCISES: CPT | Mod: PN

## 2025-05-13 NOTE — PROGRESS NOTES
Outpatient Rehab    Physical Therapy Visit    Patient Name: Mounika Barnett  MRN: [id number]  YOB: 1950  Encounter Date: 5/13/2025    Therapy Diagnosis:   Encounter Diagnosis   Name Primary?    Weakness of left upper extremity Yes     Physician: Helga Crain MD    Physician Orders: Eval and Treat  Medical Diagnosis:     Visit # / Visits Authorized:  4 / 12  Insurance Authorization Period: 4/14/2025 to 6/12/2025  Date of Evaluation: 4/14/2025  Plan of Care Certification: 4/14/2025 to 7/7/2025      PT/PTA:     Number of PTA visits since last PT visit:   Time In: 1000   Time Out: 1055  Total Time (in minutes): 55   Total Billable Time (in minutes): 55    FOTO:  Intake Score:  %  Survey Score 2:  %  Survey Score 3:  %    Precautions:       Subjective   She states she is feeling better. Less shoulder pain..         Objective            Treatment:  Therapeutic Exercise  TE 1: Pulleys flexion 3 min  TE 2: Ruperto abd 3 min  TE 3: supine shoulder flexion +3.5# wand +3x10  TE 4: supine chest press +3.5# wand +3x10  TE 5: supine serratus punch +3.5# wand +3x10  TE 6: +Landmines at EOT w/ pvc pipe 3x10  Balance/Neuromuscular Re-Education  NMR 1: Ball circles on wal CW/CCW 30x  NMR 3: Wall walk 30x  NMR 4: SL shoulder abd +3x10  NMR 5: SL shoulder flexion +3x10  NMR 6: SL shoulder ER +3x10  NMR 7: UBE 6 min postural msucles control    Time Entry(in minutes):  Neuromuscular Re-Education Time Entry: 30  Therapeutic Exercise Time Entry: 25    Assessment & Plan   Assessment: Ms. Ribera presented to PT today with continued reports of decreased left shoulder pain intensity. Pt was able to toelrated therapy well. Pt has required  minor v/c's to perform exericses. Pt has learned how to perform exericses. Pt has improved rotaotr cuff muscles strength and msucles endurance. Plan to place therpay on hold. Pt will cont wtih HEP for now.       Patient will continue to benefit from skilled outpatient physical therapy to  address the deficits listed in the problem list box on initial evaluation, provide pt/family education and to maximize pt's level of independence in the home and community environment.     Patient's spiritual, cultural, and educational needs considered and patient agreeable to plan of care and goals.           Plan: Plan to place therpay on hold.    Goals:   Active       Long term goals        2. Independent with home exercise program.         Start:  04/14/25    Expected End:  07/07/25            3. Full return to functional activities with manageable complaints.         Start:  04/14/25    Expected End:  07/07/25            4. Patient to demonstrate improved posture and body mechanics.         Start:  04/14/25    Expected End:  07/07/25            5.Decrease pain by 75% during functional activities.         Start:  04/14/25    Expected End:  07/07/25               Short term goals       Pt to increase strength by a 1/2 grade of muscles test to allow for improvement in functional activities such as performing chores.          Start:  04/14/25    Expected End:  07/07/25            3. Pt to report compliance with HEP and demonstrate proper exercise technique to PT to show competence with self management of condition.         Start:  04/14/25    Expected End:  07/07/25            4. Decrease pain by 25% during functional activities.         Start:  04/14/25    Expected End:  07/07/25                Kalyan May, PT

## 2025-06-06 ENCOUNTER — LAB VISIT (OUTPATIENT)
Dept: LAB | Facility: HOSPITAL | Age: 75
End: 2025-06-06
Attending: FAMILY MEDICINE
Payer: MEDICARE

## 2025-06-06 ENCOUNTER — RESULTS FOLLOW-UP (OUTPATIENT)
Dept: FAMILY MEDICINE | Facility: CLINIC | Age: 75
End: 2025-06-06

## 2025-06-06 ENCOUNTER — OFFICE VISIT (OUTPATIENT)
Dept: FAMILY MEDICINE | Facility: CLINIC | Age: 75
End: 2025-06-06
Payer: MEDICARE

## 2025-06-06 VITALS
WEIGHT: 79.81 LBS | SYSTOLIC BLOOD PRESSURE: 130 MMHG | HEIGHT: 62 IN | BODY MASS INDEX: 14.69 KG/M2 | HEART RATE: 79 BPM | OXYGEN SATURATION: 97 % | DIASTOLIC BLOOD PRESSURE: 66 MMHG | TEMPERATURE: 98 F

## 2025-06-06 DIAGNOSIS — E44.1 MILD PROTEIN-CALORIE MALNUTRITION: ICD-10-CM

## 2025-06-06 DIAGNOSIS — F39 MOOD DISORDER: ICD-10-CM

## 2025-06-06 DIAGNOSIS — R03.0 ELEVATED BLOOD PRESSURE READING: ICD-10-CM

## 2025-06-06 DIAGNOSIS — Z00.00 ANNUAL PHYSICAL EXAM: ICD-10-CM

## 2025-06-06 DIAGNOSIS — Z00.00 ANNUAL PHYSICAL EXAM: Primary | ICD-10-CM

## 2025-06-06 LAB
ABSOLUTE EOSINOPHIL (OHS): 0.08 K/UL
ABSOLUTE MONOCYTE (OHS): 0.57 K/UL (ref 0.3–1)
ABSOLUTE NEUTROPHIL COUNT (OHS): 3.29 K/UL (ref 1.8–7.7)
ALBUMIN SERPL BCP-MCNC: 4.3 G/DL (ref 3.5–5.2)
ALP SERPL-CCNC: 108 UNIT/L (ref 40–150)
ALT SERPL W/O P-5'-P-CCNC: 24 UNIT/L (ref 10–44)
ANION GAP (OHS): 8 MMOL/L (ref 8–16)
AST SERPL-CCNC: 27 UNIT/L (ref 11–45)
BASOPHILS # BLD AUTO: 0.05 K/UL
BASOPHILS NFR BLD AUTO: 1 %
BILIRUB SERPL-MCNC: 0.5 MG/DL (ref 0.1–1)
BUN SERPL-MCNC: 14 MG/DL (ref 8–23)
CALCIUM SERPL-MCNC: 9.7 MG/DL (ref 8.7–10.5)
CHLORIDE SERPL-SCNC: 102 MMOL/L (ref 95–110)
CHOLEST SERPL-MCNC: 198 MG/DL (ref 120–199)
CHOLEST/HDLC SERPL: 2.2 {RATIO} (ref 2–5)
CO2 SERPL-SCNC: 28 MMOL/L (ref 23–29)
CREAT SERPL-MCNC: 0.6 MG/DL (ref 0.5–1.4)
ERYTHROCYTE [DISTWIDTH] IN BLOOD BY AUTOMATED COUNT: 12.6 % (ref 11.5–14.5)
GFR SERPLBLD CREATININE-BSD FMLA CKD-EPI: >60 ML/MIN/1.73/M2
GLUCOSE SERPL-MCNC: 82 MG/DL (ref 70–110)
HCT VFR BLD AUTO: 44.7 % (ref 37–48.5)
HDLC SERPL-MCNC: 90 MG/DL (ref 40–75)
HDLC SERPL: 45.5 % (ref 20–50)
HGB BLD-MCNC: 14.9 GM/DL (ref 12–16)
IMM GRANULOCYTES # BLD AUTO: 0.01 K/UL (ref 0–0.04)
IMM GRANULOCYTES NFR BLD AUTO: 0.2 % (ref 0–0.5)
LDLC SERPL CALC-MCNC: 93.6 MG/DL (ref 63–159)
LYMPHOCYTES # BLD AUTO: 0.96 K/UL (ref 1–4.8)
MCH RBC QN AUTO: 32.8 PG (ref 27–31)
MCHC RBC AUTO-ENTMCNC: 33.3 G/DL (ref 32–36)
MCV RBC AUTO: 99 FL (ref 82–98)
NONHDLC SERPL-MCNC: 108 MG/DL
NUCLEATED RBC (/100WBC) (OHS): 0 /100 WBC
PLATELET # BLD AUTO: 294 K/UL (ref 150–450)
PMV BLD AUTO: 11 FL (ref 9.2–12.9)
POTASSIUM SERPL-SCNC: 4.9 MMOL/L (ref 3.5–5.1)
PROT SERPL-MCNC: 7.5 GM/DL (ref 6–8.4)
RBC # BLD AUTO: 4.54 M/UL (ref 4–5.4)
RELATIVE EOSINOPHIL (OHS): 1.6 %
RELATIVE LYMPHOCYTE (OHS): 19.4 % (ref 18–48)
RELATIVE MONOCYTE (OHS): 11.5 % (ref 4–15)
RELATIVE NEUTROPHIL (OHS): 66.3 % (ref 38–73)
SODIUM SERPL-SCNC: 138 MMOL/L (ref 136–145)
TRIGL SERPL-MCNC: 72 MG/DL (ref 30–150)
TSH SERPL-ACNC: 1.5 UIU/ML (ref 0.4–4)
WBC # BLD AUTO: 4.96 K/UL (ref 3.9–12.7)

## 2025-06-06 PROCEDURE — 99999 PR PBB SHADOW E&M-EST. PATIENT-LVL III: CPT | Mod: PBBFAC,,, | Performed by: FAMILY MEDICINE

## 2025-06-06 PROCEDURE — 85025 COMPLETE CBC W/AUTO DIFF WBC: CPT

## 2025-06-06 PROCEDURE — 82465 ASSAY BLD/SERUM CHOLESTEROL: CPT

## 2025-06-06 PROCEDURE — 36415 COLL VENOUS BLD VENIPUNCTURE: CPT | Mod: PO

## 2025-06-06 PROCEDURE — 82040 ASSAY OF SERUM ALBUMIN: CPT

## 2025-06-06 PROCEDURE — 84443 ASSAY THYROID STIM HORMONE: CPT
